# Patient Record
Sex: FEMALE | Race: WHITE
[De-identification: names, ages, dates, MRNs, and addresses within clinical notes are randomized per-mention and may not be internally consistent; named-entity substitution may affect disease eponyms.]

---

## 2021-08-06 ENCOUNTER — HOSPITAL ENCOUNTER (OUTPATIENT)
Dept: HOSPITAL 33 - SDC | Age: 71
LOS: 1 days | Discharge: HOME | End: 2021-08-07
Attending: STUDENT IN AN ORGANIZED HEALTH CARE EDUCATION/TRAINING PROGRAM
Payer: MEDICARE

## 2021-08-06 DIAGNOSIS — Z20.828: ICD-10-CM

## 2021-08-06 DIAGNOSIS — I10: ICD-10-CM

## 2021-08-06 DIAGNOSIS — M25.572: ICD-10-CM

## 2021-08-06 DIAGNOSIS — M21.42: Primary | ICD-10-CM

## 2021-08-06 DIAGNOSIS — Z79.899: ICD-10-CM

## 2021-08-06 DIAGNOSIS — M21.6X2: ICD-10-CM

## 2021-08-06 DIAGNOSIS — E11.9: ICD-10-CM

## 2021-08-06 DIAGNOSIS — M24.572: ICD-10-CM

## 2021-08-06 DIAGNOSIS — M19.072: ICD-10-CM

## 2021-08-06 LAB
GLUCOSE UR-MCNC: NEGATIVE MG/DL
PROT UR STRIP-MCNC: NEGATIVE MG/DL
RBC #/AREA URNS HPF: (no result) /HPF (ref 0–2)
WBC #/AREA URNS HPF: (no result) /HPF (ref 0–5)

## 2021-08-06 PROCEDURE — U0003 INFECTIOUS AGENT DETECTION BY NUCLEIC ACID (DNA OR RNA); SEVERE ACUTE RESPIRATORY SYNDROME CORONAVIRUS 2 (SARS-COV-2) (CORONAVIRUS DISEASE [COVID-19]), AMPLIFIED PROBE TECHNIQUE, MAKING USE OF HIGH THROUGHPUT TECHNOLOGIES AS DESCRIBED BY CMS-2020-01-R: HCPCS

## 2021-08-06 PROCEDURE — 94760 N-INVAS EAR/PLS OXIMETRY 1: CPT

## 2021-08-06 PROCEDURE — 96365 THER/PROPH/DIAG IV INF INIT: CPT

## 2021-08-06 PROCEDURE — 28730 FUSION OF FOOT BONES: CPT

## 2021-08-06 PROCEDURE — 73620 X-RAY EXAM OF FOOT: CPT

## 2021-08-06 PROCEDURE — 76000 FLUOROSCOPY <1 HR PHYS/QHP: CPT

## 2021-08-06 PROCEDURE — 81001 URINALYSIS AUTO W/SCOPE: CPT

## 2021-08-06 PROCEDURE — 28725 ARTHRODESIS SUBTALAR: CPT

## 2021-08-06 PROCEDURE — 87086 URINE CULTURE/COLONY COUNT: CPT

## 2021-08-06 PROCEDURE — 38220 DX BONE MARROW ASPIRATIONS: CPT

## 2021-08-06 PROCEDURE — 27687 REVISION OF CALF TENDON: CPT

## 2021-08-06 PROCEDURE — 82947 ASSAY GLUCOSE BLOOD QUANT: CPT

## 2021-08-06 RX ADMIN — METFORMIN HYDROCHLORIDE SCH: 500 TABLET ORAL at 18:01

## 2021-08-06 RX ADMIN — PREGABALIN SCH MG: 75 CAPSULE ORAL at 20:44

## 2021-08-06 RX ADMIN — LEVOFLOXACIN SCH MG: 500 TABLET, FILM COATED ORAL at 18:01

## 2021-08-06 NOTE — XRAY
Exam: Intraoperative C-arm images of the left hindfoot/midfoot from 8/6/2021.



Comparison: Weightbearing left foot radiographs from 11/12/2020.



Indication: Foot reconstruction.



Findings: 4 minutes, 3 seconds of intraoperative fluoroscopy time was used.

18 intraoperative C-arm images of the left hindfoot and midfoot were obtained.

 2 nails with distal threaded tips traverse the subtalar joint in an oblique

manner.  I also note 2 mildly crisscrossing screws traversing the

talonavicular joint.  A small sideplate is noted along the medial margin of

the left midfoot with one screw securing the proximal aspect of the small side

plate to the navicular bone and a second slightly longer screw fixing the

distal end of the sideplate into both the medial and mid cuneiform bones.

Incidentally, moderate plantar left calcaneal spur is seen.



Impression:



1.  Intraoperative left foot surgical reconstruction, as detailed above.

Please correlate with intraoperative findings/report.

## 2021-08-06 NOTE — OP
SURGERY DATE/TIME:   08/06/2021   0130



PREOPERATIVE DIAGNOSES:     

1)  Painful pes planus deformity severe. 

2) Talonavicular osteoarthritis. 

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 

4) Equinus. 

5) Navicular cuneiform arthritis. 



POSTOPERATIVE DIAGNOSES:     

1)  Painful pes planus deformity severe. 

2) Talonavicular osteoarthritis. 

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 

4) Equinus. 

5) Navicular cuneiform arthritis. 



PROCEDURES:   

1) Gastrocnemius dissection. 

2) [place] of bone marrow aspirate from left calcaneus. 

3) Arthrodesis of subtalar joint. 

4) Arthrodesis of talonavicular joint.

5) Arthrodesis of navicular cuneiform joint. 



SURGEON:    Anders Hernandez DPM. 



ASSISTANT:  None.     



ANESTHESIA:  General plus popliteal block.    



HEMOSTASIS:  Thigh tourniquet set to 350 mm of Mercury for 120 minutes with a 52 minute 
break followed by an additional 49 minutes of tourniquet time. 



ESTIMATED BLOOD LOSS:   Approximately 150 cc. 



MATERIALS:  50 cc of cancellous bone chips with bone marrow aspirate, 5 x 70 mm partially 
threaded screw, 75 mm partially threaded screw, 40 mm headless compression screw and 6 x 
50 mm headless compression screw, a one-hole Peanut plate 3.5 x 28 mm multidirectional 
nonlocking screw  and a 3.5 x 26 mm nonlocking screw, 2-0 Vicryl, 3-0 Nylon. 

INJECTABLES:  See anesthesia report for popliteal block. 30 cc of a 1:1 mixture of 1% 
lidocaine plain and 0.5% bupivacaine plain injected in ankle block type fashion 
preoperatively.  

 

DESCRIPTION OF PROCEDURE AND FINDINGS:  After adequate assessment by the preoperative 
assessment team, the patient was brought into the OR and placed on the OR table in the 
supine position. At this time adequate general anesthesia was administered and a 
well-padded thigh tourniquet was applied and the tourniquet was set to 350 mm of Mercury. 
At this time the left lower extremity was prepped and draped in the typical sterile 
fashion and attention was directed to the left lower extremity. At this time attention was 
directed to the palpable dell at the posterior aspect of the Achilles tendon of the left 
leg. At this dell an incision was made utilizing a 15 blade through the skin down to the 
layer of the subcutaneous tissue. A combination of blunt and sharp dissection was 
performed to the level of the fascia and the fascia was incised once again with the 15 
blade. At this time the aponeurosis of the gastrocnemius was visualized and identified. 
Retractors were utilized to gain adequate visualization of the entire aponeurosis and this 
was resected utilizing a 10 blade in a transverse-type fashion until the fascia was 
released in toto and there was increased mobility at the level of ankle. At this time 
attention then was directed to the lateral aspect of the calcaneus where fluoroscopy was 
used to determine adequate BMA harvest site this was performed utilizing a 15 blade which 
punctured the skin and blunt dissection was carried out using a curved hemostat. The BMA 
harvesting device was introduced at the lateral aspect of the calcaneus and 60 cc of the 
BMA was released from the calcaneus at this time and handed off the field in order to be 
spun down before use with the cancellous bone chips later in the procedure. At this time 
an Esmarch was utilized to exsanguinate the leg and the leg was elevated for 60 seconds 
prior to inflating the tourniquet to 350 mm of Mercury. At this time attention was 
directed to the lateral aspect of the subtalar joint just distal to the fibula and 
incision was made utilized a new 10 blade through the level of the skin and subcutaneous 
tissue being care not to damage any neurovascular structures in this area. Once 
encountering the peroneal tendons they were released from their synovial sheaths and 
retracted plantar and distally in order to gain exposure to the subtalar joint. A 
combination of osteotomes, rongeurs, curettes were utilized to resect the cartilage off of 
the posterior and middle facet of the subtalar joint until healthy bleeding bone in the 
subchondral plate was identified. At this time a flush was carried out removing all 
cartilaginous debris from this resection this was again inspected and deemed to be 
adequate combination of 2-0 drill bit, osteotome and rongeurs were utilized to prepare the 
joint for fusion. At this time K-wires were introduced from the calcaneus into the talus 
in the reduced position and deemed to be adequate on calcaneal axial lateral and AP views. 
At this time attention was then directed to the dorsal aspect of the foot where an 
incision was made at the dorsal medial aspect of the talonavicular joint as well as 
navicular cuneiform joint. These joints were identified and resected from their plantar 
and dorsal soft tissue attachments in order to gain exposure to the joints. The medial 
aspect of talus was determined to be extremely diseased due to the chronic deformity and 
was prepared for arthrodesis. At this time a combination of curettes, rongeurs, osteotomes 
were utilized to prepare both the talonavicular, navicular cuneiform joints to an adequate 
healthy bleeding surface and was identified. Following copious amounts of sterile saline 
were utilized to flush the surgical site of any remaining cartilaginous debris. At this 
time the joints were then pinned utilizing K-wires in their appropriate positions and 
deemed to be adequate. A 4-0 and 6 x 50 headless compression screw was introduced into the 
talonavicular joint in order to gain compression through this joint which was identified 
under fluoroscopy and deemed to be adequate. The navicular cuneiform joint was secured 
utilizing Igor Stratum one-hole Peanut Plate with a 3.5 by 28 mm multidirectional screw 
and a 3.5 x 26 mm nonlocking screw. The compression through these joints was deemed to be 
adequate at this time. The position of the foot was assessed and loaded this was checked 
under fluoroscopy. The improvement to the calcaneal inclination angle as well as talar 
declination angle was improved and the patient clinically has an arch without lateral 
abduction of the forefoot. At this time sterile saline was utilized to cleanse the 
surgical site. All remaining deficits were back filled with cancellous chips with the 
harvested bone marrow aspirate from earlier on in the procedure at this time. The 
remaining bone marrow aspirate was injected into the incision site and closure was 
performed utilizing 2-0 Vicryl and 3-0 Nylon. The leg is then cleansed with Hibiclens and 
saline. The surgical sites were then dressed with Betadine, Adaptic, 4x4, Kerlix and a 
well-padded posterior splint with sugar-tong was applied to the left lower extremity in 
orthogonal position. The patient then remained under anesthesia for a popliteal block and 
then was returned to the postoperative anesthesia care unit with vital signs stable and 
vascular status intact. The patient handled the procedure without complication and the 
anesthesia without incident. The patient's postoperative orders as indicated in the 
patient's chart.

## 2021-08-07 VITALS — HEART RATE: 52 BPM | SYSTOLIC BLOOD PRESSURE: 103 MMHG | DIASTOLIC BLOOD PRESSURE: 49 MMHG

## 2021-08-07 VITALS — OXYGEN SATURATION: 98 %

## 2021-08-07 RX ADMIN — METFORMIN HYDROCHLORIDE SCH MG: 500 TABLET ORAL at 08:40

## 2021-08-07 RX ADMIN — PREGABALIN SCH MG: 75 CAPSULE ORAL at 10:04

## 2021-08-07 RX ADMIN — LEVOFLOXACIN SCH: 500 TABLET, FILM COATED ORAL at 10:07

## 2021-08-07 NOTE — PCM.NOTE
Podiatry Post-Op Plan


Podiatry Post-Op Plan: 


Subjective: Patient seen this AM POD#1 s/p gastrocnemius recession and severely 

painful flatfoot reconstruction. She was held overnight fot 10/10 pain s/p 

popliteal block post operatively and IV pain medications. Patient is sleeping 

peacefully in bed on arrival. She was awoken and in NAD. She indicates last 

night was rough for her however there is absolutely no pain at this time. She 

currently denies any consitutional symptoms of infection and denies any other 

pedal complaints.





SOB: none


Cough: none


Calf Pain: No pain on palpation


Void: Yes





Objective:


PE LLE focused


Post operative splint clean dry and intact with no evidence of strike through.


Wiggles toes


No pain on compression of bilateral calf


Denies shortness of breath





Assessment:


POD #1 s/p 


 gastrocnemius recession and severely painful flatfoot reconstruction Left foot 

POD #1 





Post-operative plan is as follows:


Patient progressing without complication at this time. Pain is controlled. 


ANTIBIOTICS: Levofloxacin 500 mg po daily for 10 days on d/c


PAIN CONTROL: Norco 7.5/325mg q4h moderate pain. 


VTE PROPHYLAXIS:  Discharge with Xarelto 10mg daily for 30 days.


DRESSINGS: Dressing to remain clean and dry. 


ACTIVITY: NWB to left lower extremity with crutches. Prescription written for 

knee scooter at [facility].


THERAPIES: Informal assessment for gait training, assistive device training, and

assessment of functional status with NWB to the left with assistance of 

crutches. 


D/c Plan: OK to d/c today if she meets d/c criteria. Post operative medications 

prescribed and patient has obtained prior to procedure. Take as directed. 


Follow up: Thursday August 12th

## 2022-10-14 ENCOUNTER — HOSPITAL ENCOUNTER (EMERGENCY)
Dept: HOSPITAL 33 - ED | Age: 72
Discharge: HOME | End: 2022-10-14
Payer: MEDICARE

## 2022-10-14 VITALS — OXYGEN SATURATION: 95 %

## 2022-10-14 VITALS — HEART RATE: 70 BPM | SYSTOLIC BLOOD PRESSURE: 137 MMHG | DIASTOLIC BLOOD PRESSURE: 53 MMHG

## 2022-10-14 DIAGNOSIS — Z79.899: ICD-10-CM

## 2022-10-14 DIAGNOSIS — S09.8XXA: Primary | ICD-10-CM

## 2022-10-14 DIAGNOSIS — Z20.828: ICD-10-CM

## 2022-10-14 DIAGNOSIS — S70.01XA: ICD-10-CM
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
## 2022-10-14 DIAGNOSIS — M25.551: ICD-10-CM

## 2022-10-14 PROCEDURE — 90471 IMMUNIZATION ADMIN: CPT

## 2022-10-14 PROCEDURE — 90715 TDAP VACCINE 7 YRS/> IM: CPT

## 2022-10-14 PROCEDURE — 73501 X-RAY EXAM HIP UNI 1 VIEW: CPT

## 2022-10-14 PROCEDURE — 99283 EMERGENCY DEPT VISIT LOW MDM: CPT

## 2022-10-14 PROCEDURE — 70450 CT HEAD/BRAIN W/O DYE: CPT

## 2022-10-14 NOTE — XRAY
Indication: Status post fall.



Comparison: None



AP pelvis and 2 view right hip demonstrates osteopenia, small bilateral

superior acetabular spurring, old right femur neck fracture with 3 intact

fixation screws, and old lower lumbar fusion surgery.  No other bony,

articular, or soft tissue abnormalities.

## 2022-10-14 NOTE — XRAY
Indication: Status post fall.



Multiple contiguous axial images obtained through the head without contrast.



Comparison: None



Age-appropriate global atrophy.  Remote lacunar infarct left basal ganglia.

No acute intracranial hemorrhage, abnormal extra-axial fluid collection, or

mass effect.  Fourth ventricle is midline without hydrocephalus.  Gray-white

matter differentiation preserved.  Bony calvarium intact.  Visualized

paranasal sinuses and mastoid air cells are clear.



Impression: Remote lacunar infarct left basal ganglia.  Remaining CT head

without contrast exam is negative.

## 2022-10-21 ENCOUNTER — HOSPITAL ENCOUNTER (INPATIENT)
Dept: HOSPITAL 33 - SDC | Age: 72
LOS: 3 days | Discharge: NURSING, REHAB OR LONG-TERM CARE | DRG: 560 | End: 2022-10-24
Attending: FAMILY MEDICINE | Admitting: FAMILY MEDICINE
Payer: MEDICARE

## 2022-10-21 DIAGNOSIS — M25.572: ICD-10-CM

## 2022-10-21 DIAGNOSIS — M79.672: ICD-10-CM

## 2022-10-21 DIAGNOSIS — M24.072: ICD-10-CM

## 2022-10-21 DIAGNOSIS — Z20.828: ICD-10-CM

## 2022-10-21 DIAGNOSIS — Z79.899: ICD-10-CM

## 2022-10-21 DIAGNOSIS — E11.9: ICD-10-CM

## 2022-10-21 DIAGNOSIS — T84.84XA: Primary | ICD-10-CM

## 2022-10-21 DIAGNOSIS — L76.22: ICD-10-CM

## 2022-10-21 DIAGNOSIS — M76.72: ICD-10-CM

## 2022-10-21 DIAGNOSIS — M13.872: ICD-10-CM

## 2022-10-21 DIAGNOSIS — M96.0: ICD-10-CM

## 2022-10-21 DIAGNOSIS — M25.872: ICD-10-CM

## 2022-10-21 LAB
GLUCOSE UR-MCNC: >=1000 MG/DL
PROT UR STRIP-MCNC: NEGATIVE MG/DL
RBC # UR AUTO: NEGATIVE ERY/UL (ref 0–5)
RBC #/AREA URNS HPF: (no result) /HPF (ref 0–2)
UA DIPSTICK PNL UR: (no result)
WBC #/AREA URNS HPF: (no result) /HPF (ref 0–5)

## 2022-10-21 PROCEDURE — 82962 GLUCOSE BLOOD TEST: CPT

## 2022-10-21 PROCEDURE — 76942 ECHO GUIDE FOR BIOPSY: CPT

## 2022-10-21 PROCEDURE — 27635 REMOVE LOWER LEG BONE LESION: CPT

## 2022-10-21 PROCEDURE — 81001 URINALYSIS AUTO W/SCOPE: CPT

## 2022-10-21 PROCEDURE — C1713 ANCHOR/SCREW BN/BN,TIS/BN: HCPCS

## 2022-10-21 PROCEDURE — 64450 NJX AA&/STRD OTHER PN/BRANCH: CPT

## 2022-10-21 PROCEDURE — 93005 ELECTROCARDIOGRAM TRACING: CPT

## 2022-10-21 PROCEDURE — 36415 COLL VENOUS BLD VENIPUNCTURE: CPT

## 2022-10-21 PROCEDURE — 80053 COMPREHEN METABOLIC PANEL: CPT

## 2022-10-21 PROCEDURE — 85027 COMPLETE CBC AUTOMATED: CPT

## 2022-10-21 PROCEDURE — 82947 ASSAY GLUCOSE BLOOD QUANT: CPT

## 2022-10-21 PROCEDURE — 28730 FUSION OF FOOT BONES: CPT

## 2022-10-21 PROCEDURE — 76000 FLUOROSCOPY <1 HR PHYS/QHP: CPT

## 2022-10-21 PROCEDURE — 99024 POSTOP FOLLOW-UP VISIT: CPT

## 2022-10-21 PROCEDURE — 87086 URINE CULTURE/COLONY COUNT: CPT

## 2022-10-21 PROCEDURE — 29894 ANKLE ARTHROSCOPY/SURGERY: CPT

## 2022-10-21 PROCEDURE — 64447 NJX AA&/STRD FEMORAL NRV IMG: CPT

## 2022-10-21 PROCEDURE — 76937 US GUIDE VASCULAR ACCESS: CPT

## 2022-10-21 PROCEDURE — 27680 RELEASE OF LOWER LEG TENDON: CPT

## 2022-10-21 PROCEDURE — 20680 REMOVAL OF IMPLANT DEEP: CPT

## 2022-10-21 PROCEDURE — 99100 ANES PT EXTEME AGE<1 YR&>70: CPT

## 2022-10-21 PROCEDURE — 73630 X-RAY EXAM OF FOOT: CPT

## 2022-10-21 PROCEDURE — 94760 N-INVAS EAR/PLS OXIMETRY 1: CPT

## 2022-10-21 RX ADMIN — HYDROCODONE BITARTRATE AND ACETAMINOPHEN PRN TAB: 5; 325 TABLET ORAL at 21:34

## 2022-10-21 NOTE — XRAY
Indication: Left foot hardware removal, calcaneal cuboid arthrodesis,

talonavicular arthrodesis, and revision navicular cuneiform arthrodesis.



Intraoperative fluoroscopy provided for 9 minutes 38 seconds.  39 digital spot

images ultimately demonstrates revision of previous fusion surgery documented

September 12, 2022.  New mid to hindfoot fusion surgery with intact screws and

orthopedic tach.  Correlate with intraoperative findings/report.

## 2022-10-22 RX ADMIN — SIMVASTATIN SCH MG: 10 TABLET, FILM COATED ORAL at 16:43

## 2022-10-22 RX ADMIN — Medication SCH TAB: at 21:22

## 2022-10-22 RX ADMIN — ACETAMINOPHEN PRN MG: 325 TABLET ORAL at 10:50

## 2022-10-22 RX ADMIN — MAGNESIUM OXIDE TAB 400 MG (241.3 MG ELEMENTAL MG) SCH MG: 400 (241.3 MG) TAB at 21:23

## 2022-10-22 RX ADMIN — Medication SCH UNIT: at 16:44

## 2022-10-22 RX ADMIN — OXYCODONE HYDROCHLORIDE AND ACETAMINOPHEN SCH MG: 500 TABLET ORAL at 16:43

## 2022-10-22 RX ADMIN — PREGABALIN SCH MG: 75 CAPSULE ORAL at 21:23

## 2022-10-22 RX ADMIN — Medication SCH TAB: at 16:43

## 2022-10-22 RX ADMIN — Medication SCH TAB: at 17:11

## 2022-10-22 RX ADMIN — METFORMIN HYDROCHLORIDE SCH MG: 500 TABLET ORAL at 21:22

## 2022-10-22 RX ADMIN — INSULIN GLARGINE SCH UNIT: 100 INJECTION, SOLUTION SUBCUTANEOUS at 16:42

## 2022-10-22 RX ADMIN — HYDROCODONE BITARTRATE AND ACETAMINOPHEN PRN TAB: 5; 325 TABLET ORAL at 21:22

## 2022-10-22 RX ADMIN — LOSARTAN POTASSIUM SCH: 50 TABLET, FILM COATED ORAL at 16:45

## 2022-10-22 RX ADMIN — ACETAMINOPHEN PRN MG: 325 TABLET ORAL at 17:12

## 2022-10-22 RX ADMIN — PREGABALIN SCH MG: 75 CAPSULE ORAL at 16:43

## 2022-10-22 RX ADMIN — Medication SCH MG: at 16:42

## 2022-10-22 NOTE — PCM.NOTE
Podiatry Post-Op Plan


Podiatry Post-Op Plan: 


Podiatry Post-Op Plan





Post-operative plan is as follows:





ANTIBIOTICS: Ancef 1 g IV q8h x 24 hours, then switch to PO Doxy 100 mg bid x 10

days 





PAIN CONTROL: Norco 5/325mg q4h moderate pain. Discharge with Norco 5/325mg q6h





VTE PROPHYLAXIS:  Xarelto 10mg daily.





DRESSINGS: Dressing to remain clean and dry. Reinforce with Kerlix/ACE as 

necessary.





ACTIVITY: NWB to operative side.





THERAPIES: PT/OT consult for gait training, assistive device training, and 

assessment of functional status with NWB to the left. Incentive Spirometry.





PLACEMENT: CM consult for evaluation and possible SNF/Rehab placement.

## 2022-10-22 NOTE — PCM.HP
History of Present Illness





- Chief Complaint


Chief Complaint: Post-op bleed, septic non union left TN joint


History of Present Illness: 


 is a 72 year old female admitted following surgery on the left ankle 

with Dr Vasquez for hardware removal and other documented procedures, she is sta

ble and pain is controlled, non weight bearing status at this time. she is 

alert, present and has no complaints today.








- Review of Systems


Constitutional: No Fever, No Chills


Respiratory: No Cough, No Short Of Breath


Cardiac: No Chest Pain, No Edema, No Syncope


Abdominal/Gastrointestinal: No Abdominal Pain, No Nausea, No Vomiting, No 

Diarrhea


Musculoskeletal: Joint Pain (postop)


Skin: No Rash


All Other Systems: Reviewed and Negative





Medications & Allergies


Home Medications: 


                              Home Medication List





Losartan Potassium 50 mg*** [Cozaar 50 MG***] 25 mg PO DAILY 11/01/14 [History 

Confirmed 10/21/22]


Lovastatin 20 mg PO DAILY 11/01/14 [History Confirmed 10/21/22]


Metformin HCl 500 mg*** [Glucophage 500 MG***] 500 mg PO HS 11/01/14 [History 

Confirmed 10/21/22]


Gluc Fry/Chondro Fry A/Vit C/Mn [Glucosamine Chondroitin Tab] 1 each PO BID 

08/03/21 [History Confirmed 10/21/22]


Rivaroxaban 10 mg Tablet*** [Xarelto 10 mg Tablet***] 20 mg PO DAILY 08/03/21 

[History Confirmed 10/21/22]


Magnesium Oxide 400 mg*** [Mag-Ox 400***] 2 tab PO QHS 08/06/21 [History 

Confirmed 10/21/22]


Acetaminophen 325 mg*** [Tylenol 325 mg***] 2 tab PO Q8H PRN PRN 10/07/22 

[History Confirmed 10/21/22]


Alendronate Sodium 1 tab PO WEEKLY 10/07/22 [History Confirmed 10/21/22]


Ascorbic Acid [Vitamin C] 1,000 mg PO DAILY 10/07/22 [History Confirmed 

10/21/22]


Cholecalciferol (Vitamin D3) [Vitamin D3] 50 mcg PO DAILY 10/07/22 [History 

Confirmed 10/21/22]


Dapagliflozin Propanediol [Farxiga] 10 mg PO DAILY 10/07/22 [History Confirmed 

10/21/22]


Insulin Glargine,Hum.rec.anlog [Toujeo Max Solostar] 24 units SQ DAILY 10/07/22 

[History Confirmed 10/21/22]


Omeprazole Magnesium [Prilosec Otc] 1 tab PO DAILY 10/07/22 [History Confirmed 

10/21/22]


Pregabalin 75 mg PO TID 10/07/22 [History Confirmed 10/21/22]


Vitamin B Complex [B Complex] 1 tab PO DAILY 10/07/22 [History Confirmed 

10/21/22]


Calcium Carbonate/Vitamin D3 [Calcium 600-Vit D3 400 Tablet] 0 each PO BID 

10/21/22 [History Confirmed 10/21/22]


Thiamine HCl [Vitamin B-1] 250 mg PO DAILY 10/21/22 [History Confirmed 10/21/22]








Allergies/Adverse Reactions: 


                                    Allergies











Allergy/AdvReac Type Severity Reaction Status Date / Time


 


Sulfa (Sulfonamide Allergy Intermediate Hives Verified 10/21/22 09:11





Antibiotics)     


 


gabapentin AdvReac Mild Vomiting Verified 10/21/22 09:11


 


lisinopril AdvReac Mild Itching Verified 10/21/22 09:11


 


tramadol AdvReac Mild Vomiting Verified 10/21/22 09:11














- Past Medical History


Past Medical History: Yes


Neurological History: No Pertinent History


ENT History: No Pertinent History


Cardiac History: Arrhythmia, High Cholesterol, Hypertension


Respiratory History: No Pertinent History


Endocrine Medical History: Diabetes Type II


Musculoskelatal History: Fractures, Osteoarthritis


GI Medical History: GERD


 History: No Pertinent History


Pyscho-Social History: No Pertinent History


Reproductive Disorders: No Pertinent History


Comment: FX RIGHT HIP ORIF 2019, TORN RIGHT RTC SX REPAIRED ?2018, LUMBAR FUSION

2017, CARDIAC ABLATION, OSTEOPOROSIS,GERD





- Female History


Are you pregnant now?: No





- Past Surgical History


Past Surgical History: Yes


Neuro Surgical History: No Pertinent History


Cardiac History: No Pertinent History


Respiratory Surgery: No Pertinent History


GI Surgical History: Appendectomy, Cholecystectomy


Genitourinary Surgical Hx: No Pertinent History


Musculskeletal Surgical Hx: No Pertinent History, Other


Female Surgical History: Tubal Ligation, Other


Other Surgical History: TONSILECTOMY, HEMORRHOIDECTOMY, SX HX: BACK SURGERY 

(FUSION WITH CAGE), RIGHT HIP FX WITH ORIF, ROTATOR CUFF REPAIR RIGHT





- Social History


Smoking Status: Former smoker


Exposure to second hand smoke: No


Alcohol: None


Drug Use: none


Significant Family History: no pertinent family hx





- Physical Exam


Vital Signs: 


                               Vital Signs - 24 hr











  Temp Pulse Resp BP Pulse Ox


 


 10/22/22 08:00    19  


 


 10/22/22 07:25  96.3 F  65  17  118/56  96


 


 10/22/22 04:00    18  


 


 10/22/22 03:52  97.5 F  59 L  18  108/54  96


 


 10/22/22 00:00    18  


 


 10/21/22 23:48  97.8 F  67  16  103/76  96


 


 10/21/22 20:50  97.5 F  65  18  103/49  94 L


 


 10/21/22 20:28      97


 


 10/21/22 20:24   61  18   97


 


 10/21/22 20:00  97.5 F  65  18  103/49  97











General Appearance: no apparent distress, alert


Neurologic Exam: alert, oriented x 3, cooperative, normal mood/affect, nml 

cerebellar function, nml station & gait, sensation nml, No motor deficits


Respiratory Exam: normal breath sounds, lungs clear, No respiratory distress


Cardiovascular Exam: regular rate/rhythm, normal heart sounds, normal peripheral

pulses


Gastrointestinal/Abdomen Exam: soft, normal bowel sounds, No tenderness, No mass


Extremity Exam: normal inspection, normal range of motion, pelvis stable


Skin Exam: normal color, warm, dry, No rash


Wound Assessment: 


                              Skin/Wound Assessment





Wound/Incision Assessment                                  Start:  10/21/22 

19:28


Text:                                                      Status: Active       




Freq:   Q4H                                                                     




Protocol:                                                                       




 Document     10/22/22 08:00  EK  (Rec: 10/22/22 08:50  EK  [id number])


 Wound/Incision Assessment


     Left Foot


      Wound Assessment                           Shift Assessment


      Wound Type                                 Incision


      Wound Stage                                Non Pressure Wound


      Dressing Status                            Dry & Intact


      Drainage Amount                            None


      Secondary Dressing                         Elastic Bandage


      Comment                                    DRESSING C/D/I - DRESSING WILL


                                                 BE MANAGED BY DR. VASQUEZ OR


                                                 HIS NURSE ONLY. PT ABLE TO


                                                 WIGGLE TOES ON LLE, GOOD CAP


                                                 REFILL AND SENSATION.











Results





- Labs


Lab/Micro Results: 


                            Lab Results-Last 24 Hours











  10/21/22 10/21/22 10/21/22 Range/Units





  12:03 15:29 17:49 


 


POC Glucometer   159 H  198 H  (74 to 106)  mg/dL


 


Urinalys Dipstick Clnc  MAIN LAB    


 


Urine Color  YELLOW    (YELLOW)  


 


Urine Appearance  CLEAR    (CLEAR)  


 


Urine pH  6.0    (5-6)  


 


Ur Specific Gravity  >=1.030    (1.005-1.025)  


 


POC Urine Protein Conf  NEGATIVE    (Negative)  


 


Urine Ketones  NEGATIVE    (NEGATIVE)  


 


Urine Nitrite  NEGATIVE    (NEGATIVE)  


 


Urine Bilirubin  NEGATIVE    (NEGATIVE)  


 


Urine Urobilinogen  0.2    (0-1)  mg/dL


 


Urine Leukocytes  NEGATIVE    (NEGATIVE)  


 


Urine WBC (Auto)  0-2    (0-5)  /HPF


 


Urine RBC (Auto)  0-2    (0-2)  /HPF


 


U Epithel Cells (Auto)  RARE    (FEW)  /HPF


 


Urine Bacteria (Auto)  NONE    (NEGATIVE)  /HPF


 


Urine RBC  NEGATIVE    (0-5)  Roly/ul


 


Urine Mucus (Auto)  SLIGHT    (NEGATIVE)  /HPF


 


Urine Glucose  >=1000    (NEGATIVE)  mg/dL














  10/21/22 10/22/22 10/22/22 Range/Units





  21:07 07:04 11:48 


 


POC Glucometer  192 H  168 H  160 H  (74 to 106)  mg/dL


 


Urinalys Dipstick Clnc     


 


Urine Color     (YELLOW)  


 


Urine Appearance     (CLEAR)  


 


Urine pH     (5-6)  


 


Ur Specific Gravity     (1.005-1.025)  


 


POC Urine Protein Conf     (Negative)  


 


Urine Ketones     (NEGATIVE)  


 


Urine Nitrite     (NEGATIVE)  


 


Urine Bilirubin     (NEGATIVE)  


 


Urine Urobilinogen     (0-1)  mg/dL


 


Urine Leukocytes     (NEGATIVE)  


 


Urine WBC (Auto)     (0-5)  /HPF


 


Urine RBC (Auto)     (0-2)  /HPF


 


U Epithel Cells (Auto)     (FEW)  /HPF


 


Urine Bacteria (Auto)     (NEGATIVE)  /HPF


 


Urine RBC     (0-5)  Roly/ul


 


Urine Mucus (Auto)     (NEGATIVE)  /HPF


 


Urine Glucose     (NEGATIVE)  mg/dL








                                  Microbiology











 10/21/22 12:03 Urine Culture - Preliminary





 Catherized    NO GROWTH TO DATE








                                   Accuchecks











Date                           10/22/22

















- Radiology Impressions


Radiology Exams & Impressions: 


                              Radiology Procedures











 Category Date Time Status


 


 FLUOROSCOPY UP TO 1 HR Routine Exams  10/21/22 09:23 Taken


 


 FOOT (MINIMUM 3 VIEWS) Routine Exams  10/21/22 09:23 Completed














- Other Procedures and Tests


                               Respiratory Therapy





10/21/22 17:56


Incentive Spirometry Q2HWA 





10/21/22 20:25


Oxygen Nasal Cannula 2 lpm 














Assessment/Plan


(1) Painful orthopaedic hardware


Current Visit: Yes   Status: Acute   


Assessment & Plan: 


postop status managed per Dr Vasquez at this time


Code(s): T84.84XA - PAIN DUE TO INTERNAL ORTHOPEDIC PROSTH DEV/GRFT, INIT   





(2) Peroneal tendinitis of left lower extremity


Current Visit: Yes   Status: Acute   Code(s): M76.72 - PERONEAL TENDINITIS, LEFT

LEG   





(3) Post-op bleeding


Current Visit: Yes   Status: Acute   Code(s): EYU1637 -    





(4) Postsurgical arthrodesis status


Current Visit: Yes   Status: Acute   Code(s): Z98.1 - ARTHRODESIS STATUS   





(5) Type 2 diabetes mellitus


Current Visit: Yes   Status: Acute   


Assessment & Plan: 


continue home meds

## 2022-10-22 NOTE — PCM.CONS
Podiatry HPI





- Consult


Date of Consultation Date: 10/22/22


Reason for Consult: Post op


Consulting Provider: 


CHRISTINA SHERIFF DPM








- [facility]


History of Present Illness: 


 is a 72 year old female who is seen at bedside s/p left ankle 

arthroscopy with synovectomy and loos body removal, tibial exostectomy, removal 

of hardware, revision of talonavicular and naviculocunieform joints, arthrodesis

of calcaneocuboid joint, peroneal tendon debridement. Patient admitted for 

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








Medications & Allergies


Home Medications: 


                              Home Medication List





Losartan Potassium 50 mg*** [Cozaar 50 MG***] 25 mg PO DAILY 11/01/14 [History 

Confirmed 10/21/22]


Lovastatin 20 mg PO DAILY 11/01/14 [History Confirmed 10/21/22]


Metformin HCl 500 mg*** [Glucophage 500 MG***] 500 mg PO HS 11/01/14 [History 

Confirmed 10/21/22]


Gluc Fry/Chondro Fry A/Vit C/Mn [Glucosamine Chondroitin Tab] 1 each PO BID 

08/03/21 [History Confirmed 10/21/22]


Rivaroxaban 10 mg Tablet*** [Xarelto 10 mg Tablet***] 20 mg PO DAILY 08/03/21 

[History Confirmed 10/21/22]


Magnesium Oxide 400 mg*** [Mag-Ox 400***] 2 tab PO QHS 08/06/21 [History 

Confirmed 10/21/22]


Acetaminophen 325 mg*** [Tylenol 325 mg***] 2 tab PO Q8H PRN PRN 10/07/22 

[History Confirmed 10/21/22]


Alendronate Sodium 1 tab PO WEEKLY 10/07/22 [History Confirmed 10/21/22]


Ascorbic Acid [Vitamin C] 1,000 mg PO DAILY 10/07/22 [History Confirmed 

10/21/22]


Cholecalciferol (Vitamin D3) [Vitamin D3] 50 mcg PO DAILY 10/07/22 [History 

Confirmed 10/21/22]


Dapagliflozin Propanediol [Farxiga] 10 mg PO DAILY 10/07/22 [History Confirmed 

10/21/22]


Insulin Glargine,Hum.rec.anlog [Toujeo Max Solostar] 24 units SQ DAILY 10/07/22 

[History Confirmed 10/21/22]


Omeprazole Magnesium [Prilosec Otc] 1 tab PO DAILY 10/07/22 [History Confirmed 

10/21/22]


Pregabalin 75 mg PO TID 10/07/22 [History Confirmed 10/21/22]


Vitamin B Complex [B Complex] 1 tab PO DAILY 10/07/22 [History Confirmed 

10/21/22]


Calcium Carbonate/Vitamin D3 [Calcium 600-Vit D3 400 Tablet] 0 each PO BID 

10/21/22 [History Confirmed 10/21/22]


Thiamine HCl [Vitamin B-1] 250 mg PO DAILY 10/21/22 [History Confirmed 10/21/22]








Allergies/Adverse Reactions: 


                                    Allergies











Allergy/AdvReac Type Severity Reaction Status Date / Time


 


Sulfa (Sulfonamide Allergy Intermediate Hives Verified 10/21/22 09:11





Antibiotics)     


 


gabapentin AdvReac Mild Vomiting Verified 10/21/22 09:11


 


lisinopril AdvReac Mild Itching Verified 10/21/22 09:11


 


tramadol AdvReac Mild Vomiting Verified 10/21/22 09:11














- Past Medical History


Past Medical History: Yes


Neurological History: No Pertinent History


ENT History: No Pertinent History


Cardiac History: Arrhythmia, High Cholesterol, Hypertension


Respiratory History: No Pertinent History


Endocrine Medical History: Diabetes Type II


Musculoskelatal History: Fractures, Osteoarthritis


GI Medical History: GERD


 History: No Pertinent History


Pyscho-Social History: No Pertinent History


Reproductive Disorders: No Pertinent History


Comment: FX RIGHT HIP ORIF 2019, TORN RIGHT RTC SX REPAIRED ?2018, LUMBAR FUSION

2017, CARDIAC ABLATION, OSTEOPOROSIS,GERD





- Female History


Are you pregnant now?: No





- Past Surgical History


Past Surgical History: Yes


Neuro Surgical History: No Pertinent History


Cardiac History: No Pertinent History


Respiratory Surgery: No Pertinent History


GI Surgical History: Appendectomy, Cholecystectomy


Genitourinary Surgical Hx: No Pertinent History


Musculskeletal Surgical Hx: No Pertinent History, Other


Female Surgical History: Tubal Ligation, Other


Other Surgical History: TONSILECTOMY, HEMORRHOIDECTOMY, SX HX: BACK SURGERY 

(FUSION WITH CAGE), RIGHT HIP FX WITH ORIF, ROTATOR CUFF REPAIR RIGHT





- Social History


Smoking Status: Former smoker


Exposure to second hand smoke: No


Alcohol: None


Drug Use: none


Significant Family History: no pertinent family hx





Physical Exam





- General


General Appearance: no apparent distress





- Neuro


Neurologic: Epicritic and protopathic





- Vascular


Peripheral Pulses: Posterior tibialis: 2+, Dorsalis-Pedis: 2+


Capillary Refill Time: < 3 seconds


Hair Growth: Symmetrical and Bilateral


Varicosities: Negtive


Edema: None


Skin: Supple, not atrophic





- Muscular


Muscle Strength: 5/5 on all 4 quadrants (Dressings clean dry and intact to the 

left lower extremity. No strike through on dressings.)





- Narrative


Narrative Physical Exam: 


Podiatry Physical Exam








Results





- Labs


Lab/Micro Results: 





                            Lab Results-Last 24 Hours











  10/21/22 10/21/22 10/21/22 Range/Units





  09:34 12:03 15:29 


 


POC Glucometer  165 H   159 H  (74 to 106)  mg/dL


 


Urinalys Dipstick Clnc   MAIN LAB   


 


Urine Color   YELLOW   (YELLOW)  


 


Urine Appearance   CLEAR   (CLEAR)  


 


Urine pH   6.0   (5-6)  


 


Ur Specific Gravity   >=1.030   (1.005-1.025)  


 


POC Urine Protein Conf   NEGATIVE   (Negative)  


 


Urine Ketones   NEGATIVE   (NEGATIVE)  


 


Urine Nitrite   NEGATIVE   (NEGATIVE)  


 


Urine Bilirubin   NEGATIVE   (NEGATIVE)  


 


Urine Urobilinogen   0.2   (0-1)  mg/dL


 


Urine Leukocytes   NEGATIVE   (NEGATIVE)  


 


Urine WBC (Auto)   0-2   (0-5)  /HPF


 


Urine RBC (Auto)   0-2   (0-2)  /HPF


 


U Epithel Cells (Auto)   RARE   (FEW)  /HPF


 


Urine Bacteria (Auto)   NONE   (NEGATIVE)  /HPF


 


Urine RBC   NEGATIVE   (0-5)  Roly/ul


 


Urine Mucus (Auto)   SLIGHT   (NEGATIVE)  /HPF


 


Urine Glucose   >=1000   (NEGATIVE)  mg/dL














  10/21/22 10/21/22 10/22/22 Range/Units





  17:49 21:07 07:04 


 


POC Glucometer  198 H  192 H  168 H  (74 to 106)  mg/dL


 


Urinalys Dipstick Clnc     


 


Urine Color     (YELLOW)  


 


Urine Appearance     (CLEAR)  


 


Urine pH     (5-6)  


 


Ur Specific Gravity     (1.005-1.025)  


 


POC Urine Protein Conf     (Negative)  


 


Urine Ketones     (NEGATIVE)  


 


Urine Nitrite     (NEGATIVE)  


 


Urine Bilirubin     (NEGATIVE)  


 


Urine Urobilinogen     (0-1)  mg/dL


 


Urine Leukocytes     (NEGATIVE)  


 


Urine WBC (Auto)     (0-5)  /HPF


 


Urine RBC (Auto)     (0-2)  /HPF


 


U Epithel Cells (Auto)     (FEW)  /HPF


 


Urine Bacteria (Auto)     (NEGATIVE)  /HPF


 


Urine RBC     (0-5)  Roly/ul


 


Urine Mucus (Auto)     (NEGATIVE)  /HPF


 


Urine Glucose     (NEGATIVE)  mg/dL








                                   Accuchecks











Date                           10/22/22

















- Radiology Impressions


Radiology Exams & Impressions: 





                              Radiology Procedures











 Category Date Time Status


 


 FLUOROSCOPY UP TO 1 HR Routine Exams  10/21/22 09:23 Taken


 


 FOOT (MINIMUM 3 VIEWS) Routine Exams  10/21/22 09:23 Completed














- Other Procedures and Tests





                               Respiratory Therapy





10/21/22 17:56


Incentive Spirometry Q2HWA 





10/21/22 20:25


Oxygen Nasal Cannula 2 lpm 














Assessment/Plan


(1) Pseudarthrosis after fusion or arthrodesis


Current Visit: Yes   Status: Acute   Code(s): M96.0 - PSEUDARTHROSIS AFTER 

FUSION OR ARTHRODESIS   





(2) Painful orthopaedic hardware


Current Visit: Yes   Status: Acute   Code(s): T84.84XA - PAIN DUE TO INTERNAL 

ORTHOPEDIC PROSTH DEV/GRFT, INIT   





(3) Ankle impingement syndrome


Current Visit: Yes   Status: Acute   Code(s): M25.879 - OTHER SPECIFIED JOINT 

DISORDERS, UNSPECIFIED ANKLE AND FOOT   





(4) Postsurgical arthrodesis status


Current Visit: Yes   Status: Acute   Code(s): Z98.1 - ARTHRODESIS STATUS   





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


Current Visit: Yes   Status: Acute   Code(s): M96.0 - PSEUDARTHROSIS AFTER 

FUSION OR ARTHRODESIS   





(6) Peroneal tendinitis of left lower extremity


Current Visit: Yes   Status: Acute   Code(s): M76.72 - PERONEAL TENDINITIS, LEFT

LEG   





(7) Post-op bleeding


Current Visit: Yes   Status: Acute   


Assessment & Plan: 


Patient examination and evaluation


Radiographs and arthroscopic images reviewed with patient


Patient progressing at this time


CBCw/ diff and CMP at this time


Monitor Ins and Outs


H&H for assessmnet of post op bleed


Non weight bearing


Pain prophylaxis


Infection Prophylaxis


DVT prophlyaxis- resume xarelto today.


Will follow closely.


Code(s): QXZ8418 -

## 2022-10-23 LAB
ALBUMIN SERPL-MCNC: 3.5 G/DL (ref 3.5–5)
ALP SERPL-CCNC: 55 U/L (ref 38–126)
ALT SERPL-CCNC: 34 U/L (ref 0–35)
ANION GAP SERPL CALC-SCNC: 5.6 MEQ/L (ref 5–15)
AST SERPL QL: 55 U/L (ref 14–36)
BILIRUB BLD-MCNC: 0.8 MG/DL (ref 0.2–1.3)
BUN SERPL-MCNC: 16 MG/DL (ref 7–17)
CALCIUM SPEC-MCNC: 8.6 MG/DL (ref 8.4–10.2)
CHLORIDE SERPL-SCNC: 106 MMOL/L (ref 98–107)
CO2 SERPL-SCNC: 31 MMOL/L (ref 22–30)
CREAT SERPL-MCNC: 0.57 MG/DL (ref 0.52–1.04)
GFR SERPLBLD BASED ON 1.73 SQ M-ARVRAT: > 60 ML/MIN
GLUCOSE SERPL-MCNC: 122 MG/DL (ref 74–106)
HCT VFR BLD AUTO: 34 % (ref 35–47)
HGB BLD-MCNC: 10.2 G/DL (ref 12–16)
MCH RBC QN AUTO: 27.9 PG (ref 26–32)
MCHC RBC AUTO-ENTMCNC: 30 G/DL (ref 32–36)
PLATELET # BLD AUTO: 169 X10^3/UL (ref 150–450)
POTASSIUM SERPLBLD-SCNC: 3.8 MMOL/L (ref 3.5–5.1)
PROT SERPL-MCNC: 5.8 G/DL (ref 6.3–8.2)
RBC # BLD AUTO: 3.65 X10^6/UL (ref 4.1–5.4)
SODIUM SERPL-SCNC: 139 MMOL/L (ref 137–145)
WBC # BLD AUTO: 5.2 X10^3/UL (ref 4–10.5)

## 2022-10-23 RX ADMIN — MAGNESIUM OXIDE TAB 400 MG (241.3 MG ELEMENTAL MG) SCH MG: 400 (241.3 MG) TAB at 22:16

## 2022-10-23 RX ADMIN — PANTOPRAZOLE SODIUM SCH MG: 20 TABLET, DELAYED RELEASE ORAL at 10:02

## 2022-10-23 RX ADMIN — Medication SCH UNIT: at 10:01

## 2022-10-23 RX ADMIN — PREGABALIN SCH MG: 75 CAPSULE ORAL at 10:02

## 2022-10-23 RX ADMIN — SIMVASTATIN SCH MG: 10 TABLET, FILM COATED ORAL at 10:02

## 2022-10-23 RX ADMIN — INSULIN GLARGINE SCH UNIT: 100 INJECTION, SOLUTION SUBCUTANEOUS at 10:02

## 2022-10-23 RX ADMIN — Medication SCH MG: at 10:01

## 2022-10-23 RX ADMIN — ACETAMINOPHEN PRN MG: 325 TABLET ORAL at 11:03

## 2022-10-23 RX ADMIN — OXYCODONE HYDROCHLORIDE AND ACETAMINOPHEN SCH MG: 500 TABLET ORAL at 10:01

## 2022-10-23 RX ADMIN — Medication SCH TAB: at 11:04

## 2022-10-23 RX ADMIN — METFORMIN HYDROCHLORIDE SCH MG: 500 TABLET ORAL at 22:16

## 2022-10-23 RX ADMIN — PREGABALIN SCH MG: 75 CAPSULE ORAL at 22:16

## 2022-10-23 RX ADMIN — RIVAROXABAN SCH MG: 10 TABLET, FILM COATED ORAL at 10:02

## 2022-10-23 RX ADMIN — HYDROCODONE BITARTRATE AND ACETAMINOPHEN PRN TAB: 5; 325 TABLET ORAL at 19:54

## 2022-10-23 RX ADMIN — Medication SCH TAB: at 22:16

## 2022-10-23 RX ADMIN — LOSARTAN POTASSIUM SCH: 50 TABLET, FILM COATED ORAL at 11:04

## 2022-10-23 RX ADMIN — PREGABALIN SCH MG: 75 CAPSULE ORAL at 14:43

## 2022-10-23 RX ADMIN — Medication SCH TAB: at 10:02

## 2022-10-23 NOTE — PCM.NOTE
Date and Time: 10/23/22  0928





Subjective Assessment: 





pain is controlled, patient is in good spirts and is complimentary of her care 

at the hospital





Objective Exam


General Appearance: no apparent distress


Neurologic Exam: alert, oriented x 3


Wound Assessment: 


                              Skin/Wound Assessment





Wound/Incision Assessment                                  Start:  10/21/22 

19:28


Text:                                                      Status: Active       




Freq:   Q4H                                                                     




Protocol:                                                                       




 Document     10/23/22 04:00  RN  (Rec: 10/23/22 04:35  RN  [id number])


 Wound/Incision Assessment


     Left Foot


      Wound Assessment                           Shift Assessment


      Wound Type                                 Incision


      Wound Stage                                Non Pressure Wound


      Dressing Status                            Dry & Intact


      Drainage Amount                            None


      Secondary Dressing                         Elastic Bandage


      Comment                                    dressing to left lower


                                                 extremity CDI, per Dr. Campos


                                                 do not remove-if any issues


                                                 call----no changes


 Wound Photo


     Photo Taken                                 No








Respiratory Exam: normal breath sounds, lungs clear, No respiratory distress


Cardiovascular Exam: regular rate/rhythm, normal heart sounds


Gastrointestinal/Abdomen Exam: soft, No tenderness, No mass


Extremity Exam: other (cast on left lower leg, distal sensation and cap refill 

normal)





OBJECTIVE DATA


Vital Signs: 


                               Vital Signs - 24 hr











  Temp Pulse Resp BP Pulse Ox


 


 10/23/22 07:37  97.1 F  63  17  123/59  96


 


 10/23/22 04:00  97.5 F  54 L  18  114/56  96


 


 10/23/22 00:00    16  


 


 10/22/22 23:36  97.8 F  60  20  119/58  96


 


 10/22/22 19:42    16  


 


 10/22/22 19:23  97.7 F  61  16  118/56  97


 


 10/22/22 18:05      99


 


 10/22/22 16:00  96.7 F  66  21  99/46  96


 


 10/22/22 12:00  96.4 F  73  17  105/63  100








                        Pain Assessment - Last Documented











Pain Intensity [right hip low  4





back]                          


 


Pain Intensity                 0


 


Pain Scale Used                0-10 Pain Scale











Intake and Output: 


                                 Intake & Output











 10/20/22 10/21/22 10/22/22 10/23/22





 11:59 11:59 11:59 11:59


 


Intake Total   1398 1580


 


Output Total   950 1000


 


Balance   448 580


 


Weight  81.9 kg 81.9 kg 











Lab Results: 


                            Lab Results-Last 24 Hours











  10/22/22 10/22/22 10/22/22 Range/Units





  11:48 16:17 21:26 


 


WBC     (4.0-10.5)  x10^3/uL


 


RBC     (4.1-5.4)  x10^6/uL


 


Hgb     (12.0-16.0)  g/dL


 


Hct     (35-47)  %


 


MCV     ()  fL


 


MCH     (26-32)  pg


 


MCHC     (32-36)  g/dL


 


RDW     (11.5-14.0)  %


 


Plt Count     (150-450)  x10^3/uL


 


MPV     (7.5-11.0)  fL


 


Sodium     (137-145)  mmol/L


 


Potassium     (3.5-5.1)  mmol/L


 


Chloride     ()  mmol/L


 


Carbon Dioxide     (22-30)  mmol/L


 


Anion Gap     (5-15)  MEQ/L


 


BUN     (7-17)  mg/dL


 


Creatinine     (0.52-1.04)  mg/dL


 


Estimated GFR     ML/MIN


 


Glucose     ()  mg/dL


 


POC Glucometer  160 H  171 H  136 H  (74 to 106)  mg/dL


 


Calcium     (8.4-10.2)  mg/dL


 


Total Bilirubin     (0.2-1.3)  mg/dL


 


AST     (14-36)  U/L


 


ALT     (0-35)  U/L


 


Alkaline Phosphatase     ()  U/L


 


Serum Total Protein     (6.3-8.2)  g/dL


 


Albumin     (3.5-5.0)  g/dL














  10/23/22 10/23/22 10/23/22 Range/Units





  05:41 05:41 07:23 


 


WBC  5.2    (4.0-10.5)  x10^3/uL


 


RBC  3.65 L    (4.1-5.4)  x10^6/uL


 


Hgb  10.2 L    (12.0-16.0)  g/dL


 


Hct  34.0 L    (35-47)  %


 


MCV  93.2    ()  fL


 


MCH  27.9    (26-32)  pg


 


MCHC  30.0 L    (32-36)  g/dL


 


RDW  15.9 H    (11.5-14.0)  %


 


Plt Count  169    (150-450)  x10^3/uL


 


MPV  9.2    (7.5-11.0)  fL


 


Sodium   139   (137-145)  mmol/L


 


Potassium   3.8   (3.5-5.1)  mmol/L


 


Chloride   106   ()  mmol/L


 


Carbon Dioxide   31 H   (22-30)  mmol/L


 


Anion Gap   5.6   (5-15)  MEQ/L


 


BUN   16   (7-17)  mg/dL


 


Creatinine   0.57   (0.52-1.04)  mg/dL


 


Estimated GFR   > 60.0   ML/MIN


 


Glucose   122 H   ()  mg/dL


 


POC Glucometer    107 H  (74 to 106)  mg/dL


 


Calcium   8.6   (8.4-10.2)  mg/dL


 


Total Bilirubin   0.80   (0.2-1.3)  mg/dL


 


AST   55 H   (14-36)  U/L


 


ALT   34   (0-35)  U/L


 


Alkaline Phosphatase   55   ()  U/L


 


Serum Total Protein   5.8 L   (6.3-8.2)  g/dL


 


Albumin   3.5   (3.5-5.0)  g/dL











Radiology Exams: 


                              Radiology Procedures











 Category Date Time Status


 


 FLUOROSCOPY UP TO 1 HR Routine Exams  10/21/22 09:23 Taken


 


 FOOT (MINIMUM 3 VIEWS) Routine Exams  10/21/22 09:23 Completed














Assessment/Plan


(1) Painful orthopaedic hardware


Current Visit: Yes   Status: Acute   Code(s): T84.84XA - PAIN DUE TO INTERNAL 

ORTHOPEDIC PROSTH DEV/GRFT, INIT   





(2) Peroneal tendinitis of left lower extremity


Current Visit: Yes   Status: Acute   Code(s): M76.72 - PERONEAL TENDINITIS, LEFT

LEG   





(3) Post-op bleeding


Current Visit: Yes   Status: Acute   Code(s): [id number] -    





(4) Postsurgical arthrodesis status


Current Visit: Yes   Status: Acute   Code(s): Z98.1 - ARTHRODESIS STATUS   





(5) Type 2 diabetes mellitus


Current Visit: Yes   Status: Acute   


Assessment & Plan: 


accuchecks reviewed, no changes

## 2022-10-24 ENCOUNTER — HOSPITAL ENCOUNTER (INPATIENT)
Dept: HOSPITAL 33 - MED SURG | Age: 72
LOS: 3 days | Discharge: HOME | DRG: 556 | End: 2022-10-27
Attending: FAMILY MEDICINE | Admitting: FAMILY MEDICINE
Payer: MEDICARE

## 2022-10-24 VITALS — DIASTOLIC BLOOD PRESSURE: 64 MMHG | HEART RATE: 64 BPM | SYSTOLIC BLOOD PRESSURE: 142 MMHG | OXYGEN SATURATION: 94 %

## 2022-10-24 DIAGNOSIS — Z79.899: ICD-10-CM

## 2022-10-24 DIAGNOSIS — I10: ICD-10-CM

## 2022-10-24 DIAGNOSIS — M25.872: Primary | ICD-10-CM

## 2022-10-24 DIAGNOSIS — M76.72: ICD-10-CM

## 2022-10-24 DIAGNOSIS — E11.9: ICD-10-CM

## 2022-10-24 DIAGNOSIS — Z20.828: ICD-10-CM

## 2022-10-24 DIAGNOSIS — Z98.1: ICD-10-CM

## 2022-10-24 DIAGNOSIS — T84.84XA: ICD-10-CM

## 2022-10-24 DIAGNOSIS — M96.0: ICD-10-CM

## 2022-10-24 DIAGNOSIS — E78.5: ICD-10-CM

## 2022-10-24 LAB
ALBUMIN SERPL-MCNC: 3.2 G/DL (ref 3.5–5)
ALP SERPL-CCNC: 56 U/L (ref 38–126)
ALT SERPL-CCNC: 30 U/L (ref 0–35)
ANION GAP SERPL CALC-SCNC: 6.3 MEQ/L (ref 5–15)
AST SERPL QL: 41 U/L (ref 14–36)
BILIRUB BLD-MCNC: 0.8 MG/DL (ref 0.2–1.3)
BUN SERPL-MCNC: 13 MG/DL (ref 7–17)
CALCIUM SPEC-MCNC: 8.3 MG/DL (ref 8.4–10.2)
CHLORIDE SERPL-SCNC: 106 MMOL/L (ref 98–107)
CO2 SERPL-SCNC: 29 MMOL/L (ref 22–30)
CREAT SERPL-MCNC: 0.5 MG/DL (ref 0.52–1.04)
GFR SERPLBLD BASED ON 1.73 SQ M-ARVRAT: > 60 ML/MIN
GLUCOSE SERPL-MCNC: 109 MG/DL (ref 74–106)
HCT VFR BLD AUTO: 33.9 % (ref 35–47)
HGB BLD-MCNC: 10.1 G/DL (ref 12–16)
MCH RBC QN AUTO: 27.3 PG (ref 26–32)
MCHC RBC AUTO-ENTMCNC: 29.8 G/DL (ref 32–36)
PLATELET # BLD AUTO: 193 X10^3/UL (ref 150–450)
POTASSIUM SERPLBLD-SCNC: 3.6 MMOL/L (ref 3.5–5.1)
PROT SERPL-MCNC: 5.7 G/DL (ref 6.3–8.2)
RBC # BLD AUTO: 3.7 X10^6/UL (ref 4.1–5.4)
SODIUM SERPL-SCNC: 139 MMOL/L (ref 137–145)
WBC # BLD AUTO: 4.8 X10^3/UL (ref 4–10.5)

## 2022-10-24 PROCEDURE — 82947 ASSAY GLUCOSE BLOOD QUANT: CPT

## 2022-10-24 PROCEDURE — 73630 X-RAY EXAM OF FOOT: CPT

## 2022-10-24 PROCEDURE — 29405 APPL SHORT LEG CAST: CPT

## 2022-10-24 PROCEDURE — 99024 POSTOP FOLLOW-UP VISIT: CPT

## 2022-10-24 RX ADMIN — PREGABALIN SCH MG: 75 CAPSULE ORAL at 21:03

## 2022-10-24 RX ADMIN — PREGABALIN SCH MG: 75 CAPSULE ORAL at 09:11

## 2022-10-24 RX ADMIN — MAGNESIUM OXIDE TAB 400 MG (241.3 MG ELEMENTAL MG) SCH MG: 400 (241.3 MG) TAB at 21:03

## 2022-10-24 RX ADMIN — METFORMIN HYDROCHLORIDE SCH MG: 500 TABLET ORAL at 21:03

## 2022-10-24 RX ADMIN — Medication SCH TAB: at 09:13

## 2022-10-24 RX ADMIN — INSULIN GLARGINE SCH UNIT: 100 INJECTION, SOLUTION SUBCUTANEOUS at 09:09

## 2022-10-24 RX ADMIN — PREGABALIN SCH MG: 75 CAPSULE ORAL at 14:22

## 2022-10-24 RX ADMIN — SIMVASTATIN SCH MG: 10 TABLET, FILM COATED ORAL at 09:15

## 2022-10-24 RX ADMIN — RIVAROXABAN SCH MG: 10 TABLET, FILM COATED ORAL at 09:11

## 2022-10-24 RX ADMIN — Medication SCH UNIT: at 09:14

## 2022-10-24 RX ADMIN — Medication SCH TAB: at 09:10

## 2022-10-24 RX ADMIN — LOSARTAN POTASSIUM SCH MG: 50 TABLET, FILM COATED ORAL at 09:11

## 2022-10-24 RX ADMIN — Medication SCH MG: at 09:13

## 2022-10-24 RX ADMIN — PANTOPRAZOLE SODIUM SCH MG: 20 TABLET, DELAYED RELEASE ORAL at 09:12

## 2022-10-24 RX ADMIN — Medication SCH TAB: at 21:03

## 2022-10-24 RX ADMIN — OXYCODONE HYDROCHLORIDE AND ACETAMINOPHEN SCH MG: 500 TABLET ORAL at 09:13

## 2022-10-24 RX ADMIN — HYDROCODONE BITARTRATE AND ACETAMINOPHEN PRN TAB: 5; 325 TABLET ORAL at 21:04

## 2022-10-24 NOTE — OP
SURGERY DATE/TIME:  10/21/2022  1145    



PREOPERATIVE DIAGNOSES: 

1) Left ankle pain. 

2) Left foot pain.

3) Painful retained hardware calcaneus. 

4) Painful retained hardware talonavicular and naviculocuneiform joint. 

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 

6) Calcaneal cuboid joint osteoarthritis. 

7) Peroneal tendon tenosynovitis.  



POSTOPERATIVE DIAGNOSES:     

1) Left ankle pain. 

2) Left foot pain.

3) Painful retained hardware calcaneus. 

4) Painful retained hardware talonavicular and naviculocuneiform joint. 

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 

6) Calcaneocuboid joint osteoarthritis. 

7) Peroneal tendon tenosynovitis.  



PROCEDURES:   

1) Removal of hardware subtalar joint. 

2) Removal of hardware from talonavicular and naviculocuneiform joint. 

3) Revision of talonavicular joint arthrodesis. 

4) Revision of naviculocuneiform joint arthrodesis. 

5) Calcaneocuboid arthrodesis. 

6) Peroneal tendon debridement with tenosynovectomy.

7) Ankle arthroscopy with loose body removal and tibial exostectomy as well as complete 
synovectomy.  



SURGEON:    Andres Hernandez DPM. 



ASSISTANT:  None.     



ANESTHESIA:  General plus a postoperative regional popliteal and adductor canal block.  



HEMOSTASIS:  Thigh tourniquet set to 300 mm of Mercury for 115 total tourniquet minutes. 



ESTIMATED BLOOD LOSS:   300 cc. 



MATERIALS:  4-0 Monocryl, 3-0 Nylon, SUTUREGARD, Bonus Triad 1 cc, 13 x 10 staple, a 6.5 x 
54 mm and a 4.0 x 30 mm cannulated screw and partially threaded variable pitch as well as 
a 4.0 x 30 mm x2 and a 4.0 x 50 mm x2 partially threaded variable pitch screws. 

INJECTABLES:  See anesthesia report for details.  



INDICATION FOR SURGERY:  Nova is a very pleasant 72-year-old patient who underwent a 
medial double arthrodesis of the right lower extremity for a painful flat foot. The 
patient presented approximately one and a half years following the surgical intervention 
with continued pain. During the immediate postoperative course for this patient on the 
previous procedure, the patient began nonweightbearing one week after the procedure with 
the expectation that prior to the surgical intervention that it would be six weeks. The 
postoperative course was continued due to the patient's noncompliance and the patient did 
have breakage of hardware and some residual pain following the procedure. However, the 
patient was relatively satisfied at that time getting the reduction in pain that she had 
for approximately one year. At this time she presents with some new pain. However, some 
residual pain as a result of the nonunion of the talonavicular joint and naviculocuneiform 
arthrodesis. At this time the patient's pain is primarily at the anterior aspect of the 
ankle joint as well as the lateral aspect of the ankle in the area of calcaneocuboid joint 
and the peroneal tendon. The patient wished to proceed with surgical intervention 
understanding that this is a large reconstruction. She agreed to be non-weightbearing for 
the postoperative period at a minimum of six weeks. The patient is a diabetic and the 
patient was advised that we will look to the healing process and the joint for indications 
of union of the joint before proceeding with weightbearing. The patient is amenable to 
proceeding at this time. As with the last procedure, the patient understands that there 
are no guarantees as to the outcome even if she does follow the postoperative protocol to 
a T. There had been many risks that have been identified and discussed with the patient 
consisting of but not limited to infection, hematoma, seroma, possibility of delayed 
union, nonunion, nonwound healing or nonwound healing and possibility of need for further 
surgical intervention at a later date. She understands all of this and wishes to proceed 
with surgical intervention at this time.  



DESCRIPTION OF PROCEDURE AND FINDINGS:  The patient is brought into the OR and placed on 
the OR table in the supine position. At this time adequate general anesthesia was 
administered. A well-padded thigh tourniquet was applied to the patient's left thigh and 
the tourniquet was set to 350 mm of Mercury. At this time the left lower extremity was 
prepped and draped in the typical sterile fashion and lowered onto the surgical field. At 
this time under fluoroscopic guidance, switching between the lateral and calcaneal axial 
views the subtalar joint screws were removed. Following this the talonavicular joint 
screws were then removed. Ankle arthroscopy was then performed. We started initially with 
insufflation of the joint at the anteromedial aspect of the arthroscopy site. An 11 blade 
was utilized to make an incision through the skin which dissection was carried down to the 
capsule of the joint. Once the joint was encountered it was punctured utilizing the curved 
mini hemostat and flush of sterile saline was removed from the joint. A blunt trocar was 
introduced with the cannula. The trocar was then removed and the camera was introduced. 
4.0 mm x 30 degree lens was inserted into the site.  The lights were then turned off and 
under light guidance the lateral portal was established on a designated anterolateral 
portal site being careful not to damage the superficial peroneal nerve which was 
identified under the lighting. At this time the joint was cleaned of its significant 
hemorrhagic synovitis. Pictures were taken at multiple points during the procedure. The 
patient did have a significant amount of hemorrhagic synovitis which was removed as well 
as significant loose bodies at the anterior aspect of the ankle joint which was removed 
utilizing a combination of rongeurs and soft tissue clamp. The anterior osteophyte at the 
distal aspect of the tibia was then shaved down utilizing a coated fabrice under fluoroscopic 
and arthroscopic guidance. This was achieved until the surface remained smooth. The 
remaining synovitis was then cleansed from the joint. No significant osteochondral defects 
were identified. However, the cartilage did take an appearance of a combination of 
fibrocartilaginous and hyaline cartilage which indicates a significant amount of wear. At 
this time the scope was removed from the site and attention was directed to the nonunion 
of the talonavicular joint as well as naviculocuneiform joint. Both the joints were 
prepped and removed any of the nonunion. Copious amounts of sterile saline were utilized 
to flush the site. A 2.0 mm drill was utilized to fenestrate the surface of the bone. An 
osteotome was utilized to fish scale. A curette was utilized to roughen the surface of the 
bone in order to proceed with fusion. At this time 1 cc of Bonus Triad was introduced into 
the talonavicular joint in hopes to achieve a solid union across this joint. K-wires were 
pinned across the joint in order to plan for closure. At this time the calcaneocuboid 
joint was then opened on the lateral aspect and removed all of the cartilage from the 
joint and prepped for fusion. The incision was carried proximally to inspect the peroneal 
tendons where significant amount of scar tissue was identified overlying the peroneal 
tendon. The peroneal tendon tenosynovium was removed and the tendons were inspected and 
deemed to be relatively healthy given patient's age and amount of tenosynovitis that was 
seen on the lateral aspect of the foot. At this time copious amounts of sterile saline 
were utilized to flush the calcaneocuboid joint and two - 1.6 mm K-wires were utilized to 
pin across this site. 



At this time hardware was introduced starting from the talonavicular joint utilizing a 6.5 
x 54 mm screw and a 13 x 10 mm staple for the talonavicular joint. At this time the 
naviculocuneiform joint was fixated utilizing two - 4.0 x 30 mm partially threaded 
variable pitch screws and the calcaneocuboid joint was fixated two - 4.0 x 50 mm variable 
pitch screws. Following this, copious amounts of sterile saline were utilized to flush the 
surgical site. 4-0 Monocryl was utilized for subcutaneous closure. SUTUREGARD was 
introduced with 2-0 Nylon in order to alleviate the tension on the skin edges and 3-0 
Nylon were utilized in horizontal fashion to coapt the incisions at the medial and lateral 
axis of the foot and a trauma suture was introduced at the anterior aspect of the medial 
and anterolateral portals as well as the posterior hardware removal screw sites. The leg 
was cleansed with sterile saline and dried. A dressing consisting of Betadine, Adaptic, 
4x4, Kerlix and a well-padded posterior splint was applied to the patient's left lower 
extremity with the foot orthogonal relative to longitudinal axis of the leg. The patient 
was provided a popliteal and adductor canal block for controlling postoperative pain. The 
patient was then reversed from anesthesia and returned to the postoperative anesthesia 
care unit with vital signs stable and vascular status intact. The patient handled the 
anesthesia as well as the procedure without significant complication. Postoperative orders 
as indicated in the patient's discharge chart.

## 2022-10-24 NOTE — PCM.NOTE
Date and Time: 10/24/22  0846





Subjective Assessment: 





patient is doing well, has no complaints. she is interested in a swingbed stay 

due to nonweight bearing status and need for therapy and assistance with ADLs





Objective Exam


General Appearance: no apparent distress


Neurologic Exam: alert, oriented x 3


Wound Assessment: 


                              Skin/Wound Assessment





Wound/Incision Assessment                                  Start:  10/21/22 

19:28


Text:                                                      Status: Active       




Freq:   Q4H                                                                     




Protocol:                                                                       




 Document     10/24/22 08:00  EK  (Rec: 10/24/22 08:24  EK  [id number])


 Wound/Incision Assessment


     Left Foot


      Wound Assessment                           Shift Assessment


      Wound Type                                 Incision


      Wound Stage                                Non Pressure Wound


      Dressing Status                            Dry & Intact


      Drainage Amount                            None


      Secondary Dressing                         Elastic Bandage


      Comment                                    dressing to left lower


                                                 extremity CDI, per Dr. Campos


                                                 do not remove-if any issues


                                                 call----no changes


 Wound Photo


     Photo Taken                                 No








Respiratory Exam: normal breath sounds, lungs clear, No respiratory distress


Cardiovascular Exam: regular rate/rhythm, normal heart sounds


Gastrointestinal/Abdomen Exam: soft, No tenderness, No mass





OBJECTIVE DATA


Vital Signs: 


                               Vital Signs - 24 hr











  Temp Pulse Resp BP Pulse Ox


 


 10/24/22 08:00    17  


 


 10/24/22 07:41  97.0 F  58 L  16  137/64  96


 


 10/24/22 04:00  97.9 F  62  20  131/56  97


 


 10/24/22 00:00    18  


 


 10/23/22 23:44  97.8 F  55 L  18  118/56  98


 


 10/23/22 20:00    18  


 


 10/23/22 19:44  97.8 F  65  18  142/63  97


 


 10/23/22 16:00  98.2 F  64  17  135/56  99


 


 10/23/22 12:00    19  


 


 10/23/22 11:31  97.5 F  67  17  120/58  96








                        Pain Assessment - Last Documented











Pain Intensity [right hip low  4





back]                          


 


Pain Intensity                 0


 


Pain Scale Used                0-10 Pain Scale











Intake and Output: 


                                 Intake & Output











 10/21/22 10/22/22 10/23/22 10/24/22





 11:59 11:59 11:59 11:59


 


Intake Total  1398 1930 1710


 


Output Total  950 1300 1525


 


Balance  448 630 185


 


Weight 81.9 kg 81.9 kg  











Lab Results: 


                            Lab Results-Last 24 Hours











  10/23/22 10/23/22 10/23/22 Range/Units





  11:39 16:32 16:36 


 


WBC     (4.0-10.5)  x10^3/uL


 


RBC     (4.1-5.4)  x10^6/uL


 


Hgb     (12.0-16.0)  g/dL


 


Hct     (35-47)  %


 


MCV     ()  fL


 


MCH     (26-32)  pg


 


MCHC     (32-36)  g/dL


 


RDW     (11.5-14.0)  %


 


Plt Count     (150-450)  x10^3/uL


 


MPV     (7.5-11.0)  fL


 


Sodium     (137-145)  mmol/L


 


Potassium     (3.5-5.1)  mmol/L


 


Chloride     ()  mmol/L


 


Carbon Dioxide     (22-30)  mmol/L


 


Anion Gap     (5-15)  MEQ/L


 


BUN     (7-17)  mg/dL


 


Creatinine     (0.52-1.04)  mg/dL


 


Estimated GFR     ML/MIN


 


Glucose     ()  mg/dL


 


POC Glucometer  147 H  102  209 H  (74 to 106)  mg/dL


 


Calcium     (8.4-10.2)  mg/dL


 


Total Bilirubin     (0.2-1.3)  mg/dL


 


AST     (14-36)  U/L


 


ALT     (0-35)  U/L


 


Alkaline Phosphatase     ()  U/L


 


Serum Total Protein     (6.3-8.2)  g/dL


 


Albumin     (3.5-5.0)  g/dL














  10/23/22 10/24/22 10/24/22 Range/Units





  21:18 04:20 04:20 


 


WBC   4.8   (4.0-10.5)  x10^3/uL


 


RBC   3.70 L   (4.1-5.4)  x10^6/uL


 


Hgb   10.1 L   (12.0-16.0)  g/dL


 


Hct   33.9 L   (35-47)  %


 


MCV   91.6   ()  fL


 


MCH   27.3   (26-32)  pg


 


MCHC   29.8 L   (32-36)  g/dL


 


RDW   15.2 H   (11.5-14.0)  %


 


Plt Count   193   (150-450)  x10^3/uL


 


MPV   9.9   (7.5-11.0)  fL


 


Sodium    139  (137-145)  mmol/L


 


Potassium    3.6  (3.5-5.1)  mmol/L


 


Chloride    106  ()  mmol/L


 


Carbon Dioxide    29  (22-30)  mmol/L


 


Anion Gap    6.3  (5-15)  MEQ/L


 


BUN    13  (7-17)  mg/dL


 


Creatinine    0.50 L  (0.52-1.04)  mg/dL


 


Estimated GFR    > 60.0  ML/MIN


 


Glucose    109 H  ()  mg/dL


 


POC Glucometer  160 H    (74 to 106)  mg/dL


 


Calcium    8.3 L  (8.4-10.2)  mg/dL


 


Total Bilirubin    0.80  (0.2-1.3)  mg/dL


 


AST    41 H  (14-36)  U/L


 


ALT    30  (0-35)  U/L


 


Alkaline Phosphatase    56  ()  U/L


 


Serum Total Protein    5.7 L  (6.3-8.2)  g/dL


 


Albumin    3.2 L  (3.5-5.0)  g/dL














  10/24/22 Range/Units





  07:07 


 


WBC   (4.0-10.5)  x10^3/uL


 


RBC   (4.1-5.4)  x10^6/uL


 


Hgb   (12.0-16.0)  g/dL


 


Hct   (35-47)  %


 


MCV   ()  fL


 


MCH   (26-32)  pg


 


MCHC   (32-36)  g/dL


 


RDW   (11.5-14.0)  %


 


Plt Count   (150-450)  x10^3/uL


 


MPV   (7.5-11.0)  fL


 


Sodium   (137-145)  mmol/L


 


Potassium   (3.5-5.1)  mmol/L


 


Chloride   ()  mmol/L


 


Carbon Dioxide   (22-30)  mmol/L


 


Anion Gap   (5-15)  MEQ/L


 


BUN   (7-17)  mg/dL


 


Creatinine   (0.52-1.04)  mg/dL


 


Estimated GFR   ML/MIN


 


Glucose   ()  mg/dL


 


POC Glucometer  108 H  (74 to 106)  mg/dL


 


Calcium   (8.4-10.2)  mg/dL


 


Total Bilirubin   (0.2-1.3)  mg/dL


 


AST   (14-36)  U/L


 


ALT   (0-35)  U/L


 


Alkaline Phosphatase   ()  U/L


 


Serum Total Protein   (6.3-8.2)  g/dL


 


Albumin   (3.5-5.0)  g/dL














Assessment/Plan


(1) Painful orthopaedic hardware


Current Visit: Yes   Status: Acute   


Assessment & Plan: 


no changes at this time, followed post-op by Dr Bernard. her pain is controlled


Code(s): T84.84XA - PAIN DUE TO INTERNAL ORTHOPEDIC PROSTH DEV/GRFT, INIT   





(2) Peroneal tendinitis of left lower extremity


Current Visit: Yes   Status: Acute   Code(s): M76.72 - PERONEAL TENDINITIS, LEFT

LEG   





(3) Post-op bleeding


Current Visit: Yes   Status: Acute   Code(s): PON5849 -    





(4) Postsurgical arthrodesis status


Current Visit: Yes   Status: Acute   Code(s): Z98.1 - ARTHRODESIS STATUS   





(5) Type 2 diabetes mellitus


Current Visit: Yes   Status: Acute

## 2022-10-24 NOTE — XRAY
9 minutes and 30 seconds for left foot hardware removal, calcaneal cuboid

arthrodesis, talonavicular arthrodesis, and revision navicular cuneiform

arthrodesis.

## 2022-10-25 RX ADMIN — Medication SCH UNIT: at 10:23

## 2022-10-25 RX ADMIN — Medication SCH TAB: at 10:24

## 2022-10-25 RX ADMIN — OXYCODONE HYDROCHLORIDE AND ACETAMINOPHEN SCH MG: 500 TABLET ORAL at 10:24

## 2022-10-25 RX ADMIN — SIMVASTATIN SCH MG: 10 TABLET, FILM COATED ORAL at 10:24

## 2022-10-25 RX ADMIN — Medication SCH TAB: at 10:29

## 2022-10-25 RX ADMIN — PANTOPRAZOLE SODIUM SCH MG: 20 TABLET, DELAYED RELEASE ORAL at 10:25

## 2022-10-25 RX ADMIN — RIVAROXABAN SCH MG: 10 TABLET, FILM COATED ORAL at 21:58

## 2022-10-25 RX ADMIN — INSULIN GLARGINE SCH UNIT: 100 INJECTION, SOLUTION SUBCUTANEOUS at 10:25

## 2022-10-25 RX ADMIN — PREGABALIN SCH MG: 75 CAPSULE ORAL at 21:59

## 2022-10-25 RX ADMIN — HYDROCODONE BITARTRATE AND ACETAMINOPHEN PRN TAB: 5; 325 TABLET ORAL at 21:59

## 2022-10-25 RX ADMIN — Medication SCH TAB: at 22:03

## 2022-10-25 RX ADMIN — PREGABALIN SCH MG: 75 CAPSULE ORAL at 15:33

## 2022-10-25 RX ADMIN — MAGNESIUM OXIDE TAB 400 MG (241.3 MG ELEMENTAL MG) SCH MG: 400 (241.3 MG) TAB at 21:58

## 2022-10-25 RX ADMIN — LOSARTAN POTASSIUM SCH MG: 50 TABLET, FILM COATED ORAL at 10:24

## 2022-10-25 RX ADMIN — Medication SCH MG: at 10:23

## 2022-10-25 RX ADMIN — PREGABALIN SCH MG: 75 CAPSULE ORAL at 10:24

## 2022-10-25 RX ADMIN — METFORMIN HYDROCHLORIDE SCH MG: 500 TABLET ORAL at 21:59

## 2022-10-25 NOTE — PCM.CONS
Podiatry HPI





- Consult


Date of Consultation Date: 10/24/22


Reason for Consult: Post op


Consulting Provider: 


CHRISTINA SHERIFF DPM








- HPI


History of Present Illness: 


 is a 72 year old female POD #3 doing well. working with PT








Medications & Allergies


Home Medications: 


                              Home Medication List





Losartan Potassium 50 mg*** [Cozaar 50 MG***] 25 mg PO DAILY 11/01/14 [History 

Confirmed 10/24/22]


Lovastatin 20 mg PO DAILY 11/01/14 [History Confirmed 10/24/22]


Metformin HCl 500 mg*** [Glucophage 500 MG***] 500 mg PO HS 11/01/14 [History 

Confirmed 10/24/22]


Gluc Fry/Chondro Fry A/Vit C/Mn [Glucosamine Chondroitin Tab] 1 each PO BID 

08/03/21 [History Confirmed 10/24/22]


Rivaroxaban 10 mg Tablet*** [Xarelto 10 mg Tablet***] 20 mg PO DAILY 08/03/21 

[History Confirmed 10/24/22]


Magnesium Oxide 400 mg*** [Mag-Ox 400***] 2 tab PO QHS 08/06/21 [History 

Confirmed 10/24/22]


Acetaminophen 325 mg*** [Tylenol 325 mg***] 2 tab PO Q8H PRN PRN 10/07/22 

[History Confirmed 10/24/22]


Ascorbic Acid [Vitamin C] 1,000 mg PO DAILY 10/07/22 [History Confirmed 

10/24/22]


Cholecalciferol (Vitamin D3) [Vitamin D3] 50 mcg PO DAILY 10/07/22 [History 

Confirmed 10/24/22]


Dapagliflozin Propanediol [Farxiga] 10 mg PO DAILY 10/07/22 [History Confirmed 

10/24/22]


Insulin Glargine,Hum.rec.anlog [Toujeo Max Solostar] 24 units SQ DAILY 10/07/22 

[History Confirmed 10/24/22]


Omeprazole Magnesium [Prilosec Otc] 1 tab PO DAILY 10/07/22 [History Confirmed 

10/24/22]


Pregabalin 75 mg PO TID 10/07/22 [History Confirmed 10/24/22]


Vitamin B Complex [B Complex] 1 tab PO DAILY 10/07/22 [History Confirmed 

10/24/22]


Calcium Carbonate/Vitamin D3 [Calcium 600-Vit D3 400 Tablet] 0 each PO BID 

10/21/22 [History Confirmed 10/24/22]


Thiamine HCl [Vitamin B-1] 250 mg PO DAILY 10/21/22 [History Confirmed 10/24/22]








Allergies/Adverse Reactions: 


                                    Allergies











Allergy/AdvReac Type Severity Reaction Status Date / Time


 


Sulfa (Sulfonamide Allergy Intermediate Hives Verified 10/24/22 15:30





Antibiotics)     


 


gabapentin AdvReac Mild Vomiting Verified 10/24/22 15:30


 


lisinopril AdvReac Mild Itching Verified 10/24/22 15:30


 


tramadol AdvReac Mild Vomiting Verified 10/24/22 15:30














- Past Medical History


Past Medical History: Yes


Neurological History: No Pertinent History


ENT History: No Pertinent History


Cardiac History: Arrhythmia, High Cholesterol, Hypertension


Respiratory History: No Pertinent History


Endocrine Medical History: Diabetes Type II


Musculoskelatal History: Fractures, Osteoarthritis


GI Medical History: GERD


 History: No Pertinent History


Pyscho-Social History: No Pertinent History


Reproductive Disorders: No Pertinent History


Comment: FX RIGHT HIP ORIF 2019, TORN RIGHT RTC SX REPAIRED ?2018, LUMBAR FUSION

2017, CARDIAC ABLATION, OSTEOPOROSIS,GERD





- Female History


Are you pregnant now?: No





- Past Surgical History


Past Surgical History: Yes


Neuro Surgical History: No Pertinent History


Cardiac History: No Pertinent History


Respiratory Surgery: No Pertinent History


GI Surgical History: Appendectomy, Cholecystectomy


Genitourinary Surgical Hx: No Pertinent History


Musculskeletal Surgical Hx: No Pertinent History, Other


Female Surgical History: Tubal Ligation, Other


Other Surgical History: TONSILECTOMY, HEMORRHOIDECTOMY, SX HX: BACK SURGERY 

(FUSION WITH CAGE), RIGHT HIP FX WITH ORIF, ROTATOR CUFF REPAIR RIGHT





- Social History


Smoking Status: Never smoker


Exposure to second hand smoke: No


Alcohol: None


Drug Use: none


Significant Family History: no pertinent family hx





Physical Exam





- General


General Appearance: no apparent distress





- Neuro


Neurologic: Epicritic and protopathic





- Vascular


Peripheral Pulses: Posterior tibialis: 2+, Dorsalis-Pedis: 2+


Capillary Refill Time: < 3 seconds


Hair Growth: Symmetrical and Bilateral


Varicosities: Negtive


Edema: None


Skin: Supple, not atrophic


Skin Temperature: Warm to touch (Dressings to LLE clean dry and intact.)





- Narrative


Narrative Physical Exam: 


Podiatry Physical Exam








Results





- Labs


Lab/Micro Results: 





                            Lab Results-Last 24 Hours











  10/24/22 10/25/22 10/25/22 Range/Units





  21:01 07:42 11:43 


 


POC Glucometer  146 H  127 H  159 H  (74 to 106)  mg/dL








                                   Accuchecks











Date                           10/25/22


 


Date                           10/25/22


 


Date                           10/24/22


 


Date                           10/24/22


 


Time                           12:00


 


Time                           08:00


 


Time                           21:35


 


Time                           16:27

















Assessment/Plan


(1) Ankle impingement syndrome


Current Visit: No   Status: Acute   


Assessment & Plan: 


Patient examination and evaluation


Radiographs and arthroscopic images reviewed with patient


Patient progressing at this time


CBCw/ diff and CMP at this time


Monitor Ins and Outs


Non weight bearing


Pain prophylaxis


Infection Prophylaxis


DVT prophlyaxis- xarelto


Will follow closely.


Code(s): M25.879 - OTHER SPECIFIED JOINT DISORDERS, UNSPECIFIED ANKLE AND FOOT  







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


Current Visit: No   Status: Acute   Code(s): M96.0 - PSEUDARTHROSIS AFTER FUSION

OR ARTHRODESIS   





(3) Painful orthopaedic hardware


Current Visit: No   Status: Acute   Code(s): T84.84XA - PAIN DUE TO INTERNAL 

ORTHOPEDIC PROSTH DEV/GRFT, INIT   





(4) Peroneal tendinitis of left lower extremity


Current Visit: No   Status: Acute   Code(s): M76.72 - PERONEAL TENDINITIS, LEFT 

LEG   





(5) Post-op bleeding


Current Visit: No   Status: Acute   Code(s): ERH2558 -    





(6) Postsurgical arthrodesis status


Current Visit: No   Status: Acute   Code(s): Z98.1 - ARTHRODESIS STATUS   





(7) Pseudarthrosis after fusion or arthrodesis


Current Visit: No   Status: Acute   Code(s): M96.0 - PSEUDARTHROSIS AFTER FUSION

OR ARTHRODESIS   





(8) Type 2 diabetes mellitus


Current Visit: No   Status: Acute

## 2022-10-26 VITALS — OXYGEN SATURATION: 98 %

## 2022-10-26 RX ADMIN — PANTOPRAZOLE SODIUM SCH MG: 20 TABLET, DELAYED RELEASE ORAL at 08:17

## 2022-10-26 RX ADMIN — PREGABALIN SCH MG: 75 CAPSULE ORAL at 08:17

## 2022-10-26 RX ADMIN — LOSARTAN POTASSIUM SCH MG: 50 TABLET, FILM COATED ORAL at 08:18

## 2022-10-26 RX ADMIN — SIMVASTATIN SCH MG: 10 TABLET, FILM COATED ORAL at 08:20

## 2022-10-26 RX ADMIN — Medication SCH TAB: at 08:18

## 2022-10-26 RX ADMIN — Medication SCH UNIT: at 08:17

## 2022-10-26 RX ADMIN — MAGNESIUM OXIDE TAB 400 MG (241.3 MG ELEMENTAL MG) SCH MG: 400 (241.3 MG) TAB at 22:01

## 2022-10-26 RX ADMIN — Medication SCH TAB: at 08:17

## 2022-10-26 RX ADMIN — METFORMIN HYDROCHLORIDE SCH MG: 500 TABLET ORAL at 22:01

## 2022-10-26 RX ADMIN — OXYCODONE HYDROCHLORIDE AND ACETAMINOPHEN SCH MG: 500 TABLET ORAL at 08:20

## 2022-10-26 RX ADMIN — PREGABALIN SCH MG: 75 CAPSULE ORAL at 16:10

## 2022-10-26 RX ADMIN — PREGABALIN SCH MG: 75 CAPSULE ORAL at 22:01

## 2022-10-26 RX ADMIN — INSULIN GLARGINE SCH UNIT: 100 INJECTION, SOLUTION SUBCUTANEOUS at 08:37

## 2022-10-26 RX ADMIN — Medication SCH MG: at 08:16

## 2022-10-26 RX ADMIN — RIVAROXABAN SCH MG: 10 TABLET, FILM COATED ORAL at 22:01

## 2022-10-26 RX ADMIN — Medication SCH TAB: at 22:00

## 2022-10-26 NOTE — XRAY
Indication: Follow-up surgery October 26, 2022.



Comparison: September 12, 2022



3 nonweightbearing views left foot demonstrates new posterior splint material

limiting evaluation for fine bony detail.  Revision of previous mid to

hindfoot fusion surgery with new 1st/2nd cuneiform navicular screws, 2

calcaneal cuboid screws, and talonavicular screw/orthopedic staple.  Anterior

talus demonstrates remnant fracture screw tip.  Remaining foot demonstrates

stable osteopenia plantar heel spur.

## 2022-10-27 VITALS — HEART RATE: 95 BPM | SYSTOLIC BLOOD PRESSURE: 107 MMHG | DIASTOLIC BLOOD PRESSURE: 55 MMHG

## 2022-10-27 RX ADMIN — LOSARTAN POTASSIUM SCH MG: 50 TABLET, FILM COATED ORAL at 09:32

## 2022-10-27 RX ADMIN — Medication SCH MG: at 09:31

## 2022-10-27 RX ADMIN — Medication SCH UNIT: at 09:31

## 2022-10-27 RX ADMIN — INSULIN GLARGINE SCH UNIT: 100 INJECTION, SOLUTION SUBCUTANEOUS at 09:39

## 2022-10-27 RX ADMIN — PANTOPRAZOLE SODIUM SCH MG: 20 TABLET, DELAYED RELEASE ORAL at 09:32

## 2022-10-27 RX ADMIN — Medication SCH TAB: at 09:31

## 2022-10-27 RX ADMIN — PREGABALIN SCH MG: 75 CAPSULE ORAL at 14:09

## 2022-10-27 RX ADMIN — Medication SCH TAB: at 09:32

## 2022-10-27 RX ADMIN — PREGABALIN SCH MG: 75 CAPSULE ORAL at 09:32

## 2022-10-27 RX ADMIN — OXYCODONE HYDROCHLORIDE AND ACETAMINOPHEN SCH MG: 500 TABLET ORAL at 09:31

## 2022-10-27 RX ADMIN — SIMVASTATIN SCH MG: 10 TABLET, FILM COATED ORAL at 09:31

## 2022-10-27 NOTE — PCM.DS
Discharge Summary


Date of Admission: 


10/24/22 14:50





Admitting Physician: 


PHILLY GIBBONS





Primary Care Provider: 


SOLO CABRERA








Allergies


Allergies





Sulfa (Sulfonamide Antibiotics) Allergy (Intermediate, Verified 10/24/22 15:30)


   Hives


gabapentin Adverse Reaction (Mild, Verified 10/24/22 15:30)


   Vomiting


lisinopril Adverse Reaction (Mild, Verified 10/24/22 15:30)


   Itching


tramadol Adverse Reaction (Mild, Verified 10/24/22 15:30)


   Vomiting











Hospital Summary





- Hospital Course


Hospital Course: 





patient was admitted following surgical repair of left ankle by Dr Vasquez, she 

is currently non weight bearing.





- Vitals & Intake/Output


Vital Signs: 





                                   Vital Signs











Temperature  98.4 F   10/27/22 07:51


 


Pulse Rate  95 H  10/27/22 07:51


 


Respiratory Rate  16   10/27/22 07:51


 


Blood Pressure  107/55   10/27/22 07:51


 


O2 Sat by Pulse Oximetry  98   10/27/22 07:51











Intake & Output: 





                                 Intake & Output











 10/24/22 10/25/22 10/26/22 10/27/22





 11:59 11:59 11:59 11:59


 


Intake Total  570 300 820


 


Balance  570 300 820


 


Weight  83.6 kg  83.6 kg














- Lab


Lab Results-Last 24 Hrs: 





                            Lab Results-Last 24 Hours











  10/26/22 10/26/22 10/26/22 Range/Units





  11:29 16:21 21:15 


 


POC Glucometer  158 H  231 H  136 H  (74 to 106)  mg/dL














  10/27/22 Range/Units





  08:05 


 


POC Glucometer  105  (74 to 106)  mg/dL











Micro Results-Entire Visit: 





                                   Accuchecks











Date                           10/27/22


 


Date                           10/26/22


 


Date                           10/26/22


 


Time                           08:08


 


Time                           16:51


 


Time                           11:30

















- Radiology Exams


Ordered Rad Exams-Entire Visit: 





                              Radiology Procedures











 Category Date Time Status


 


 FOOT (MINIMUM 3 VIEWS) Urgent Exams  10/26/22 17:01 Completed














- Procedures and Test


Procedures and Tests throughout Hospitalization: 





                            Therapy Orders & Screens





10/24/22 15:02


OT Eval and Treat (MD Order) ONCE 


   Comment: 


   Consulting Provider: PHILLY GIBBONS


   Physician Instructions: 


   Reason For Exam: DECONDITIONING R/T POST OP BLEED, SEPTIC JOINT


   Evaluate: Yes


   Treat: Yes


   Reason for Evaluation: SWING BED


   Diagnosis: Post-op bleed, septic non union left TN joint


PT Eval & Treat (MD Order) ONCE 


   Reason for Eval:: Post op


   Diagnosis: Left ankle pain





10/24/22 15:18


OT Eval and Treat (MD Order) ONCE 


   Comment: 


   Consulting Provider: 


   Physician Instructions: 


   Reason For Exam: 


   Diagnosis: Post-op bleed, septic non union left TN joint





10/24/22 16:27


PT Clarification Order ROUTINE 


   Comment: 


   Physician Instructions: 


   Reason For Exam: 


   PT Clarification: P.T. TO RX 5X/WK UNTIL D/C TO ADDRESS FUNCTIONAL MOBILITY 

AND GAIT AND STEP TRAINING, THER


                       EX, BALANCE ACTIVITIES AS WELL AS PT. ED. RE: HEP, 

PN/EDEMA MG'T AND SAFETY AWARENESS TO


                       MAXIMIZE FUNCTIONAL INDEPENDENCE FOR SAFE RETURN HOME.





10/26/22 17:05


OT Clarification Order ROUTINE 


   Comment: 


   Physician Instructions: 


   Reason For Exam: 


   OT Clarification: 5X/WEEK AND BID PRN














Discharge Exam


General Appearance: no apparent distress, alert


Respiratory Exam: normal breath sounds, lungs clear, No respiratory distress


Cardiovascular Exam: regular rate/rhythm, normal heart sounds


Gastrointestinal/Abdomen Exam: soft, No tenderness, No mass


Extremity Exam: other (cap refill and sensation to left toes intact, cast 

intact)


Wound Assessment: 





                              Skin/Wound Assessment





Wound/Incision Assessment                                  Start:  10/24/22 

20:27


Text:                                                      Status: Active       




Freq:                                                                           




Protocol:                                                                       




 Document     10/24/22 20:00  KEVON  (Rec: 10/24/22 20:28  KEVON  

[id number])


 Wound/Incision Assessment


     Left Foot


      Wound Assessment                           Shift Assessment


      Wound Type                                 Incision


      Wound Stage                                Non Pressure Wound


      Dressing Status                            Dry & Intact


      Drainage Amount                            None


      Comment                                    DRESSING DONE PER DR. VASQUEZ,


                                                 CLEAN, DRY AND INTACT,


                                                 ELEVATED ON PILLOW











Final Diagnosis/Problem List





- Final Discharge Diagnosis/Problem


(1) Ankle impingement syndrome


Current Visit: No   Status: Acute   


Assessment & Plan: 


doing well postop


Code(s): M25.879 - OTHER SPECIFIED JOINT DISORDERS, UNSPECIFIED ANKLE AND FOOT  







(2) Postsurgical arthrodesis status


Current Visit: No   Status: Acute   Code(s): Z98.1 - ARTHRODESIS STATUS   





(3) Type 2 diabetes mellitus


Current Visit: No   Status: Acute   





- Discharge


Disposition: Home, Self-Care


Condition: Stable


Prescriptions: 


Continue


   Losartan Potassium 50 mg*** [Cozaar 50 MG***] 25 mg PO DAILY


   Metformin HCl 500 mg*** [Glucophage 500 MG***] 500 mg PO HS


   Lovastatin 20 mg PO DAILY


   Gluc Fry/Chondro Fry A/Vit C/Mn [Glucosamine Chondroitin Tab] 1 each PO BID


   Rivaroxaban 10 mg Tablet*** [Xarelto 10 mg Tablet***] 20 mg PO DAILY


   Magnesium Oxide 400 mg*** [Mag-Ox 400***] 2 tab PO QHS


   Dapagliflozin Propanediol [Farxiga] 10 mg PO DAILY


   Vitamin B Complex [B Complex] 1 tab PO DAILY


   Omeprazole Magnesium [Prilosec Otc] 1 tab PO DAILY


   Pregabalin 75 mg PO TID


   Insulin Glargine,Hum.rec.anlog [Toujeo Max Solostar] 24 units SQ DAILY


   Ascorbic Acid [Vitamin C] 1,000 mg PO DAILY


   Acetaminophen 325 mg*** [Tylenol 325 mg***] 2 tab PO Q8H PRN PRN


     PRN Reason: Pain


   Cholecalciferol (Vitamin D3) [Vitamin D3] 50 mcg PO DAILY


   Thiamine HCl [Vitamin B-1] 250 mg PO DAILY


   Calcium Carbonate/Vitamin D3 [Calcium 600-Vit D3 400 Tablet] 0 each PO BID


Follow up with: 


SOLO CABRERA [Primary Care Provider] -

## 2024-12-18 ENCOUNTER — HOSPITAL ENCOUNTER (OUTPATIENT)
Dept: HOSPITAL 33 - SDC-PAIN | Age: 74
Discharge: HOME | End: 2024-12-18
Attending: PSYCHIATRY & NEUROLOGY
Payer: MEDICARE

## 2024-12-18 DIAGNOSIS — E11.9: ICD-10-CM

## 2024-12-18 DIAGNOSIS — M47.816: Primary | ICD-10-CM

## 2024-12-18 PROCEDURE — 77002 NEEDLE LOCALIZATION BY XRAY: CPT

## 2024-12-18 PROCEDURE — 72020 X-RAY EXAM OF SPINE 1 VIEW: CPT

## 2024-12-18 PROCEDURE — 82947 ASSAY GLUCOSE BLOOD QUANT: CPT

## 2024-12-18 NOTE — XRAY
Indication: Bilateral L4-S1 MBB.



Intraoperative fluoroscopy was provided for 30 seconds.  2 digital spot images

submitted for interpretation demonstrates posterior needle tips projecting

over expected left and right L4-S1 nerve roots.  Correlate with intraoperative

findings/report.  Incidental bilateral L4-L5 fusion hardware.

## 2025-01-06 ENCOUNTER — HOSPITAL ENCOUNTER (EMERGENCY)
Dept: HOSPITAL 33 - ED | Age: 75
Discharge: HOME | End: 2025-01-06
Payer: MEDICARE

## 2025-01-06 VITALS
RESPIRATION RATE: 18 BRPM | SYSTOLIC BLOOD PRESSURE: 149 MMHG | OXYGEN SATURATION: 98 % | TEMPERATURE: 97.8 F | HEART RATE: 64 BPM | DIASTOLIC BLOOD PRESSURE: 45 MMHG

## 2025-01-06 DIAGNOSIS — Z79.84: ICD-10-CM

## 2025-01-06 DIAGNOSIS — Z79.4: ICD-10-CM

## 2025-01-06 DIAGNOSIS — Z79.01: ICD-10-CM

## 2025-01-06 DIAGNOSIS — E11.9: ICD-10-CM

## 2025-01-06 DIAGNOSIS — J30.9: Primary | ICD-10-CM

## 2025-01-06 DIAGNOSIS — Z79.899: ICD-10-CM

## 2025-01-06 LAB
FLUAV AG NPH QL IA: NEGATIVE
FLUBV AG NPH QL IA: NEGATIVE
RSV AG SPEC QL IA: NEGATIVE
S PYO AG SPEC QL: NOT DETECTED
SARS-COV-2 AG RESP QL IA.RAPID: POSITIVE

## 2025-01-06 PROCEDURE — 0241U: CPT

## 2025-01-06 PROCEDURE — 87651 STREP A DNA AMP PROBE: CPT

## 2025-01-06 PROCEDURE — 99283 EMERGENCY DEPT VISIT LOW MDM: CPT

## 2025-01-06 NOTE — ERPHSYRPT
- History of Present Illness


Source: patient


Exam Limitations: no limitations


Patient Subjective Stated Complaint: pt here for runny nose, sore throat for 3 

days


Triage Nursing Assessment: pt alert, walked in, face mask in place, skin w/d/p. 

runny nose, no cough


Physician History: 





Patient has symptoms of chronic allergic rhinitis and allergy symptomsShe has 

not been treated for thisShe works in healthcare and is concerned that she may 

beInfectiousAre contagious.  She basically just wanted to make sure that she is 

not contagious.  She thinks that it may be allergies because the symptoms have 

started since she is moved to this area from [place].  She has not being 

treated for any allergy symptoms.


Allergies/Adverse Reactions: 








Sulfa (Sulfonamide Antibiotics) Allergy (Intermediate, Verified 01/06/25 11:01)


   Hives


gabapentin Adverse Reaction (Mild, Verified 01/06/25 11:01)


   Vomiting


lisinopril Adverse Reaction (Mild, Verified 01/06/25 11:01)


   Itching


tramadol Adverse Reaction (Mild, Verified 01/06/25 11:01)


   Vomiting





Home Medications: 








Losartan Potassium 50 mg*** [Cozaar 50 MG***] 25 mg PO DAILY 11/01/14 [History]


Lovastatin 20 mg PO DAILY 11/01/14 [History]


Metformin HCl 500 mg*** [Glucophage 500 MG***] 500 mg PO HS 11/01/14 [History]


Gluc Fry/Chondro Fry A/Vit C/Mn [Glucosamine Chondroitin Tab] 1 each PO BID 

08/03/21 [History]


Rivaroxaban 10 mg Tablet*** [Xarelto 10 mg Tablet***] 20 mg PO DAILY 08/03/21 

[History]


Magnesium Oxide 400 mg*** [Mag-Ox 400***] 2 tab PO QHS 08/06/21 [History]


Acetaminophen 325 mg*** [Tylenol 325 mg***] 2 tab PO Q8H PRN PRN 10/07/22 

[History]


Ascorbic Acid [Vitamin C] 1,000 mg PO DAILY 10/07/22 [History]


Cholecalciferol (Vitamin D3) [Vitamin D3] 50 mcg PO DAILY 10/07/22 [History]


Dapagliflozin Propanediol [Farxiga] 10 mg PO DAILY 10/07/22 [History]


Insulin Glargine,Hum.rec.anlog [Toujeo Max Solostar] 24 units SQ DAILY 10/07/22 

[History]


Omeprazole Magnesium [Prilosec Otc] 1 tab PO DAILY 10/07/22 [History]


Pregabalin 75 mg PO TID 10/07/22 [History]


Vitamin B Complex [B Complex] 1 tab PO DAILY 10/07/22 [History]


Calcium Carbonate/Vitamin D3 [Calcium 600-Vit D3 400 Tablet] 0 each PO BID 

10/21/22 [History]


Thiamine HCl [Vitamin B-1] 250 mg PO DAILY 10/21/22 [History]





Hx Tetanus, Diphtheria Vaccination/Date Given: Yes (UNKNOWN)


Hx Influenza Vaccination/Date Given: Yes


Hx Pneumococcal Vaccination/Date Given: No


Immunizations Up to Date: Yes





Travel Risk





- International Travel


Have you traveled outside of the country in past 3 weeks: No





- Emerging Infectious Disease


Are you exhibiting symptoms associated with any current EIDs: No





- Review of Systems


Constitutional: No Symptoms


Ears, Nose, & Throat: Nose Congestion, Sinus Drainage, Throat Pain


Respiratory: No Symptoms





- Past Medical History


Pertinent Past Medical History: Yes


Neurological History: Migraines


ENT History: No Pertinent History


Cardiac History: Arrhythmia


Respiratory History: No Pertinent History


Endocrine Medical History: Diabetes Type II


Musculoskeletal History: Osteoarthritis


GI Medical History: GERD


 History: No Pertinent History


Psycho-Social History: No Pertinent History


Female Reproductive Disorders: No Pertinent History


Other Medical History: ABLATION TO HEART FOR A-FIB THAT WAS A-FLUTTER. R HIP 

FRACTURE, 3 BOLTS, FOOT SURGERY. BACK SURGERY.





- Past Surgical History


Past Surgical History: Yes


Neuro Surgical History: No Pertinent History


Cardiac: No Pertinent History


Respiratory: No Pertinent History


Gastrointestinal: Appendectomy, Cholecystectomy


Genitourinary: No Pertinent History


Musculoskeletal: No Pertinent History, Other


Female Surgical History: Tubal Ligation, Other


Other Surgical History: TONSILECTOMY, HEMORRHOIDECTOMY, SX HX: BACK SURGERY 

(FUSION WITH CAGE), RIGHT HIP FX WITH ORIF, ROTATOR CUFF REPAIR RIGHT


Significant Family History: no pertinent family hx





- Social History


Smoking Status: Never smoker


Exposure to second hand smoke: No


Drug Use: none


Patient Lives Alone: No





- Social Determinants of Health


Will the patient participate in the screening: Declined to provide





- Nursing Vital Signs


Nursing Vital Signs: 


                               Initial Vital Signs











Temperature  97.8 F   01/06/25 11:07


 


Pulse Rate  64   01/06/25 11:07


 


Respiratory Rate  18   01/06/25 11:07


 


Blood Pressure  149/45   01/06/25 11:07


 


O2 Sat by Pulse Oximetry  98   01/06/25 11:07








                                   Pain Scale











Pain Intensity                 2

















- Physical Exam


General Appearance: no apparent distress


Eye Exam: PERRL/EOMI


Ears, Nose, Throat Exam: normal ENT inspection


Neck Exam: normal inspection


Respiratory Exam: normal breath sounds


Skin Exam: normal color


SpO2: 98





- Course


Nursing assessment & vital signs reviewed: Yes





- Progress


Progress: unchanged


Air Movement: good


Progress Note: 


On the differential was allergic syndrome as well as infectious etiologies.  We 

got a strep throat culture as well as a COVID flu and RSV swabThose all came 

back negativeI am going to treat her with Benadryl,Flonase and Chloraseptic.


01/06/25 11:38








Medical Desision Making





- Risk of complications


Minimal Risk: Minimal risk of morbidity





- Departure


Departure Disposition: Home


Clinical Impression: 


 Allergic rhinitis





Condition: Stable


Critical Care Time: No


Referrals: 


SOLO CABRERA [Primary Care Provider] - Follow up/PCP as directed


Instructions:  Sore Throat, Adult (DC)

## 2025-01-22 ENCOUNTER — HOSPITAL ENCOUNTER (OUTPATIENT)
Dept: HOSPITAL 33 - SDC-PAIN | Age: 75
Discharge: HOME | End: 2025-01-22
Attending: PSYCHIATRY & NEUROLOGY
Payer: MEDICARE

## 2025-01-22 DIAGNOSIS — E11.9: ICD-10-CM

## 2025-01-22 DIAGNOSIS — M47.816: Primary | ICD-10-CM

## 2025-01-22 PROCEDURE — 77002 NEEDLE LOCALIZATION BY XRAY: CPT

## 2025-01-22 PROCEDURE — 64493 INJ PARAVERT F JNT L/S 1 LEV: CPT

## 2025-01-22 PROCEDURE — 82947 ASSAY GLUCOSE BLOOD QUANT: CPT

## 2025-01-22 PROCEDURE — 72020 X-RAY EXAM OF SPINE 1 VIEW: CPT

## 2025-01-22 PROCEDURE — 64494 INJ PARAVERT F JNT L/S 2 LEV: CPT

## 2025-01-22 NOTE — XRAY
Indication: Bilateral L4-S1 MBB.



Intraoperative fluoroscopy provided for 33 seconds.  2 digital spot image

submitted for interpretation demonstrates posterior needle tips projecting

over expected left and right L4-S1 nerve roots.  Correlate with intraoperative

findings/report.  Incidental bilateral L4-L5 fusion hardware.

## 2025-02-13 ENCOUNTER — HOSPITAL ENCOUNTER (OUTPATIENT)
Dept: HOSPITAL 33 - SDC-PAIN | Age: 75
Discharge: HOME | End: 2025-02-13
Attending: PSYCHIATRY & NEUROLOGY
Payer: MEDICARE

## 2025-02-13 DIAGNOSIS — M47.816: Primary | ICD-10-CM

## 2025-02-13 DIAGNOSIS — E11.9: ICD-10-CM

## 2025-02-13 PROCEDURE — 99100 ANES PT EXTEME AGE<1 YR&>70: CPT

## 2025-02-13 PROCEDURE — 82947 ASSAY GLUCOSE BLOOD QUANT: CPT

## 2025-02-13 PROCEDURE — 64635 DESTROY LUMB/SAC FACET JNT: CPT

## 2025-02-13 PROCEDURE — 64636 DESTROY L/S FACET JNT ADDL: CPT

## 2025-02-13 PROCEDURE — 72100 X-RAY EXAM L-S SPINE 2/3 VWS: CPT

## 2025-02-13 PROCEDURE — 77002 NEEDLE LOCALIZATION BY XRAY: CPT

## 2025-02-13 NOTE — XRAY
Indication: Right L4-S1 RFA.



Intraoperative fluoroscopy provided for 37 seconds.  3 digital spot image

submitted for interpretation demonstrates posterior needle tips projecting

over expected right L4-S1 nerve roots.  Correlate with intraoperative

findings/report.  Incidental bilateral L4-L5 fusion hardware.

## 2025-02-27 ENCOUNTER — HOSPITAL ENCOUNTER (OUTPATIENT)
Dept: HOSPITAL 33 - SDC-PAIN | Age: 75
Discharge: HOME | End: 2025-02-27
Attending: PSYCHIATRY & NEUROLOGY
Payer: MEDICARE

## 2025-02-27 DIAGNOSIS — E11.9: ICD-10-CM

## 2025-02-27 DIAGNOSIS — M47.816: Primary | ICD-10-CM

## 2025-02-27 PROCEDURE — 77002 NEEDLE LOCALIZATION BY XRAY: CPT

## 2025-02-27 PROCEDURE — 72100 X-RAY EXAM L-S SPINE 2/3 VWS: CPT

## 2025-02-27 PROCEDURE — 64635 DESTROY LUMB/SAC FACET JNT: CPT

## 2025-02-27 PROCEDURE — 64636 DESTROY L/S FACET JNT ADDL: CPT

## 2025-02-27 PROCEDURE — 99100 ANES PT EXTEME AGE<1 YR&>70: CPT

## 2025-02-27 PROCEDURE — 82947 ASSAY GLUCOSE BLOOD QUANT: CPT

## 2025-02-27 NOTE — XRAY
Indication: Left L4-S1 RFA.



Intraoperative fluoroscopy provided for 31 seconds.  3 digital spot image

submitted for interpretation demonstrates posterior needle tips projecting

over expected left L4-S1 nerve roots.  Correlate with intraoperative

findings/report.  Incidental bilateral L4-L5 fusion hardware